# Patient Record
Sex: FEMALE | Race: WHITE | NOT HISPANIC OR LATINO | Employment: FULL TIME | URBAN - NONMETROPOLITAN AREA
[De-identification: names, ages, dates, MRNs, and addresses within clinical notes are randomized per-mention and may not be internally consistent; named-entity substitution may affect disease eponyms.]

---

## 2019-07-28 ENCOUNTER — HOSPITAL ENCOUNTER (EMERGENCY)
Facility: HOSPITAL | Age: 29
Discharge: HOME | End: 2019-07-28
Attending: EMERGENCY MEDICINE | Admitting: EMERGENCY MEDICINE
Payer: COMMERCIAL

## 2019-07-28 DIAGNOSIS — R50.9 FEVER: Primary | ICD-10-CM

## 2019-07-28 DIAGNOSIS — N71.9 ENDOMETRITIS: ICD-10-CM

## 2019-07-28 LAB
ANION GAP SERPL CALCULATED.3IONS-SCNC: 14 MMOL/L (ref 4–13)
BACTERIA UR QL AUTO: ABNORMAL /HPF
BASOPHILS # BLD AUTO: 0.05 THOUSANDS/ΜL (ref 0–0.1)
BASOPHILS NFR BLD AUTO: 0 % (ref 0–1)
BILIRUB UR QL STRIP: NEGATIVE
BUN SERPL-MCNC: 11 MG/DL (ref 5–25)
CALCIUM SERPL-MCNC: 8.6 MG/DL (ref 8.3–10.1)
CHLORIDE SERPL-SCNC: 103 MMOL/L (ref 100–108)
CLARITY UR: CLEAR
CO2 SERPL-SCNC: 23 MMOL/L (ref 21–32)
COLOR UR: YELLOW
CREAT SERPL-MCNC: 0.6 MG/DL (ref 0.6–1.3)
EOSINOPHIL # BLD AUTO: 0.16 THOUSAND/ΜL (ref 0–0.61)
EOSINOPHIL NFR BLD AUTO: 1 % (ref 0–6)
ERYTHROCYTE [DISTWIDTH] IN BLOOD BY AUTOMATED COUNT: 13.2 % (ref 11.6–15.1)
GFR SERPL CREATININE-BSD FRML MDRD: 125 ML/MIN/1.73SQ M
GLUCOSE SERPL-MCNC: 97 MG/DL (ref 65–140)
GLUCOSE UR STRIP-MCNC: NEGATIVE MG/DL
HCT VFR BLD AUTO: 34 % (ref 34.8–46.1)
HGB BLD-MCNC: 10.9 G/DL (ref 11.5–15.4)
HGB UR QL STRIP.AUTO: ABNORMAL
IMM GRANULOCYTES # BLD AUTO: 0.26 THOUSAND/UL (ref 0–0.2)
IMM GRANULOCYTES NFR BLD AUTO: 2 % (ref 0–2)
KETONES UR STRIP-MCNC: NEGATIVE MG/DL
LEUKOCYTE ESTERASE UR QL STRIP: ABNORMAL
LYMPHOCYTES # BLD AUTO: 1.29 THOUSANDS/ΜL (ref 0.6–4.47)
LYMPHOCYTES NFR BLD AUTO: 10 % (ref 14–44)
MAGNESIUM SERPL-MCNC: 1.6 MG/DL (ref 1.6–2.6)
MCH RBC QN AUTO: 30.7 PG (ref 26.8–34.3)
MCHC RBC AUTO-ENTMCNC: 32.1 G/DL (ref 31.4–37.4)
MCV RBC AUTO: 96 FL (ref 82–98)
MONOCYTES # BLD AUTO: 1.05 THOUSAND/ΜL (ref 0.17–1.22)
MONOCYTES NFR BLD AUTO: 8 % (ref 4–12)
NEUTROPHILS # BLD AUTO: 10.11 THOUSANDS/ΜL (ref 1.85–7.62)
NEUTS SEG NFR BLD AUTO: 79 % (ref 43–75)
NITRITE UR QL STRIP: NEGATIVE
NON-SQ EPI CELLS URNS QL MICRO: ABNORMAL /HPF
NRBC BLD AUTO-RTO: 0 /100 WBCS
PH UR STRIP.AUTO: 7 [PH]
PLATELET # BLD AUTO: 340 THOUSANDS/UL (ref 149–390)
PMV BLD AUTO: 9.2 FL (ref 8.9–12.7)
POTASSIUM SERPL-SCNC: 3.5 MMOL/L (ref 3.5–5.3)
PROT UR STRIP-MCNC: NEGATIVE MG/DL
RBC # BLD AUTO: 3.55 MILLION/UL (ref 3.81–5.12)
RBC #/AREA URNS AUTO: ABNORMAL /HPF
SODIUM SERPL-SCNC: 140 MMOL/L (ref 136–145)
SP GR UR STRIP.AUTO: <=1.005 (ref 1–1.03)
UROBILINOGEN UR QL STRIP.AUTO: 0.2 E.U./DL
WBC # BLD AUTO: 12.92 THOUSAND/UL (ref 4.31–10.16)
WBC #/AREA URNS AUTO: ABNORMAL /HPF

## 2019-07-28 PROCEDURE — 99283 EMERGENCY DEPT VISIT LOW MDM: CPT | Performed by: EMERGENCY MEDICINE

## 2019-07-28 PROCEDURE — 80048 BASIC METABOLIC PNL TOTAL CA: CPT | Performed by: EMERGENCY MEDICINE

## 2019-07-28 PROCEDURE — 36415 COLL VENOUS BLD VENIPUNCTURE: CPT | Performed by: EMERGENCY MEDICINE

## 2019-07-28 PROCEDURE — 81001 URINALYSIS AUTO W/SCOPE: CPT | Performed by: EMERGENCY MEDICINE

## 2019-07-28 PROCEDURE — 99283 EMERGENCY DEPT VISIT LOW MDM: CPT

## 2019-07-28 PROCEDURE — 83735 ASSAY OF MAGNESIUM: CPT | Performed by: EMERGENCY MEDICINE

## 2019-07-28 PROCEDURE — 85025 COMPLETE CBC W/AUTO DIFF WBC: CPT | Performed by: EMERGENCY MEDICINE

## 2019-07-28 RX ORDER — AMOXICILLIN AND CLAVULANATE POTASSIUM 875; 125 MG/1; MG/1
1 TABLET, FILM COATED ORAL EVERY 12 HOURS
Qty: 20 TABLET | Refills: 0 | Status: SHIPPED | OUTPATIENT
Start: 2019-07-28 | End: 2019-08-07

## 2019-07-28 RX ORDER — AMOXICILLIN AND CLAVULANATE POTASSIUM 875; 125 MG/1; MG/1
1 TABLET, FILM COATED ORAL ONCE
Status: COMPLETED | OUTPATIENT
Start: 2019-07-28 | End: 2019-07-28

## 2019-07-28 RX ORDER — ACETAMINOPHEN 325 MG/1
650 TABLET ORAL ONCE
Status: COMPLETED | OUTPATIENT
Start: 2019-07-28 | End: 2019-07-28

## 2019-07-28 RX ADMIN — AMOXICILLIN AND CLAVULANATE POTASSIUM 1 TABLET: 875; 125 TABLET, FILM COATED ORAL at 23:47

## 2019-07-28 RX ADMIN — ACETAMINOPHEN 650 MG: 325 TABLET, FILM COATED ORAL at 22:49

## 2019-07-29 NOTE — ED PROVIDER NOTES
History  Chief Complaint   Patient presents with    Fever - 9 weeks to 74 years     Pt who is 1 week post partum c/o acute onset fever ~ 3 hours ago  - 101 orally at home - took 400mg ibuprofen at that time - denies herminio rayjens     This is an otherwise healthy 61-year-old female who is 1 week postpartum from an uncomplicated spontaneous vaginal delivery who presents with a fever  At approximately 7:00 p m , the patient started to feel feverish and chills  She took her temperature and it was 101 orally  She took 400 mg of ibuprofen at that time  She denies any other symptoms  Denies any sick contacts  She does admit to mild vaginal bleeding which has improved since her delivery  Denies nausea/vomiting, lightheadedness/dizziness, numbness/weakness, headache, change in vision, URI symptoms, neck pain, chest pain, palpitations, shortness of breath, cough, back pain, flank pain, abdominal pain, diarrhea, hematochezia, melena, dysuria, hematuria, abnormal vaginal discharge/bleeding  She also denies any breast swelling or erythema  None       History reviewed  No pertinent past medical history  Past Surgical History:   Procedure Laterality Date    TONSILLECTOMY      WISDOM TOOTH EXTRACTION         History reviewed  No pertinent family history  I have reviewed and agree with the history as documented  Social History     Tobacco Use    Smoking status: Never Smoker    Smokeless tobacco: Never Used   Substance Use Topics    Alcohol use: Not Currently    Drug use: Never        Review of Systems   Constitutional: Negative for chills and fever  HENT: Negative for rhinorrhea, sore throat and trouble swallowing  Eyes: Negative for photophobia and visual disturbance  Respiratory: Negative for cough, chest tightness and shortness of breath  Cardiovascular: Negative for chest pain, palpitations and leg swelling     Gastrointestinal: Negative for abdominal pain, blood in stool, diarrhea, nausea and vomiting  Endocrine: Negative for polyuria  Genitourinary: Negative for dysuria, flank pain, hematuria, vaginal bleeding and vaginal discharge  Musculoskeletal: Negative for back pain and neck pain  Skin: Negative for color change and rash  Allergic/Immunologic: Negative for immunocompromised state  Neurological: Negative for dizziness, weakness, light-headedness, numbness and headaches  All other systems reviewed and are negative  Physical Exam  Physical Exam   Constitutional: Vital signs are normal  She appears well-developed  She is cooperative  No distress  HENT:   Mouth/Throat: Uvula is midline, oropharynx is clear and moist and mucous membranes are normal    Eyes: Pupils are equal, round, and reactive to light  Conjunctivae and EOM are normal    Neck: Trachea normal  No thyroid mass and no thyromegaly present  Cardiovascular: Normal rate, regular rhythm, normal heart sounds, intact distal pulses and normal pulses  No murmur heard  Pulmonary/Chest: Effort normal and breath sounds normal    Abdominal: Soft  Normal appearance and bowel sounds are normal  There is no tenderness  There is no rebound, no guarding and no CVA tenderness  Neurological: She is alert  Skin: Skin is warm, dry and intact  Psychiatric: She has a normal mood and affect   Her speech is normal and behavior is normal  Thought content normal        Vital Signs  ED Triage Vitals [07/28/19 2208]   Temperature Pulse Respirations Blood Pressure SpO2   99 6 °F (37 6 °C) 92 18 138/63 97 %      Temp Source Heart Rate Source Patient Position - Orthostatic VS BP Location FiO2 (%)   Temporal Monitor Sitting Right arm --      Pain Score       3           Vitals:    07/28/19 2208   BP: 138/63   Pulse: 92   Patient Position - Orthostatic VS: Sitting         Visual Acuity      ED Medications  Medications   acetaminophen (TYLENOL) tablet 650 mg (650 mg Oral Given 7/28/19 2249)   amoxicillin-clavulanate (AUGMENTIN) 875-125 mg per tablet 1 tablet (1 tablet Oral Given 7/28/19 1641)       Diagnostic Studies  Results Reviewed     Procedure Component Value Units Date/Time    Urine Microscopic [[id number]]  (Abnormal) Collected:  07/28/19 2300    Lab Status:  Final result Specimen:  Urine, Clean Catch Updated:  07/28/19 2325     RBC, UA 2-4 /hpf      WBC, UA 4-10 /hpf      Epithelial Cells Occasional /hpf      Bacteria, UA Occasional /hpf     Basic metabolic panel [[id number]]  (Abnormal) Collected:  07/28/19 2301    Lab Status:  Final result Specimen:  Blood from Arm, Right Updated:  07/28/19 2320     Sodium 140 mmol/L      Potassium 3 5 mmol/L      Chloride 103 mmol/L      CO2 23 mmol/L      ANION GAP 14 mmol/L      BUN 11 mg/dL      Creatinine 0 60 mg/dL      Glucose 97 mg/dL      Calcium 8 6 mg/dL      eGFR 125 ml/min/1 73sq m     Narrative:       [place] guidelines for Chronic Kidney Disease (CKD):     Stage 1 with normal or high GFR (GFR > 90 mL/min/1 73 square meters)    Stage 2 Mild CKD (GFR = 60-89 mL/min/1 73 square meters)    Stage 3A Moderate CKD (GFR = 45-59 mL/min/1 73 square meters)    Stage 3B Moderate CKD (GFR = 30-44 mL/min/1 73 square meters)    Stage 4 Severe CKD (GFR = 15-29 mL/min/1 73 square meters)    Stage 5 End Stage CKD (GFR <15 mL/min/1 73 square meters)  Note: GFR calculation is accurate only with a steady state creatinine    Magnesium [[id number]]  (Normal) Collected:  07/28/19 2301    Lab Status:  Final result Specimen:  Blood from Arm, Right Updated:  07/28/19 2320     Magnesium 1 6 mg/dL     UA w Reflex to Microscopic w Reflex to Culture [[id number]]  (Abnormal) Collected:  07/28/19 2300    Lab Status:  Final result Specimen:  Urine, Clean Catch Updated:  07/28/19 2310     Color, UA Yellow     Clarity, UA Clear     Specific Gravity, UA <=1 005     pH, UA 7 0     Leukocytes, UA Moderate     Nitrite, UA Negative     Protein, UA Negative mg/dl      Glucose, UA Negative mg/dl      Ketones, UA Negative mg/dl      Urobilinogen, UA 0 2 E U /dl      Bilirubin, UA Negative     Blood, UA Small    CBC and differential [[id number]]  (Abnormal) Collected:  07/28/19 2301    Lab Status:  Final result Specimen:  Blood from Arm, Right Updated:  07/28/19 2310     WBC 12 92 Thousand/uL      RBC 3 55 Million/uL      Hemoglobin 10 9 g/dL      Hematocrit 34 0 %      MCV 96 fL      MCH 30 7 pg      MCHC 32 1 g/dL      RDW 13 2 %      MPV 9 2 fL      Platelets 908 Thousands/uL      nRBC 0 /100 WBCs      Neutrophils Relative 79 %      Immat GRANS % 2 %      Lymphocytes Relative 10 %      Monocytes Relative 8 %      Eosinophils Relative 1 %      Basophils Relative 0 %      Neutrophils Absolute 10 11 Thousands/µL      Immature Grans Absolute 0 26 Thousand/uL      Lymphocytes Absolute 1 29 Thousands/µL      Monocytes Absolute 1 05 Thousand/µL      Eosinophils Absolute 0 16 Thousand/µL      Basophils Absolute 0 05 Thousands/µL                  No orders to display              Procedures  Procedures       ED Course                               MDM  Number of Diagnoses or Management Options  Diagnosis management comments: This is a well-appearing 73-year-old female with a normal physical exam   Likely endometritis verses urinary tract infection  Will check lab work and urinalysis  Likely discharge on Augmentin for possible endometritis  Follow up with OBGYN  Disposition  Final diagnoses:   Fever   Endometritis     Time reflects when diagnosis was documented in both MDM as applicable and the Disposition within this note     Time User Action Codes Description Comment    7/28/2019 11:37 PM Quyen Camargo [R50 9] Fever     7/28/2019 11:37 PM Quyen Camargo [N71 9] Endometritis       ED Disposition     ED Disposition Condition Date/Time Comment    Discharge Stable Sun Jul 28, 2019 11:37 PM Chrissy discharge to home/self care              Follow-up Information     Follow up With Specialties Details Why Contact Info Additional [address] [facility] Emergency Department Emergency Medicine Go to  If symptoms worsen [address] [id number] [phone] [place] [facility], [address], [place], [address] [facility], [address]    OB/GYN  Schedule an appointment as soon as possible for a visit  Call in the morning for an appointment            Discharge Medication List as of 7/28/2019 11:38 PM      START taking these medications    Details   amoxicillin-clavulanate (AUGMENTIN) 875-125 mg per tablet Take 1 tablet by mouth every 12 (twelve) hours for 10 days, Starting Sun 7/28/2019, Until Wed 8/7/2019, Print           No discharge procedures on file      ED Provider  Electronically Signed by           Charlie Lindquist MD  07/29/19 9246

## 2019-08-01 VITALS
SYSTOLIC BLOOD PRESSURE: 138 MMHG | BODY MASS INDEX: 24.8 KG/M2 | DIASTOLIC BLOOD PRESSURE: 63 MMHG | HEART RATE: 92 BPM | OXYGEN SATURATION: 97 % | TEMPERATURE: 99.6 F | WEIGHT: 140 LBS | RESPIRATION RATE: 18 BRPM

## 2021-02-13 ENCOUNTER — HOSPITAL ENCOUNTER (EMERGENCY)
Facility: HOSPITAL | Age: 31
Discharge: HOME | End: 2021-02-13
Attending: FAMILY MEDICINE | Admitting: FAMILY MEDICINE
Payer: COMMERCIAL

## 2021-02-13 VITALS
SYSTOLIC BLOOD PRESSURE: 132 MMHG | OXYGEN SATURATION: 97 % | WEIGHT: 150 LBS | HEART RATE: 104 BPM | RESPIRATION RATE: 18 BRPM | DIASTOLIC BLOOD PRESSURE: 69 MMHG | TEMPERATURE: 97.9 F | BODY MASS INDEX: 26.57 KG/M2

## 2021-02-13 DIAGNOSIS — L02.31 ABSCESS OF RIGHT BUTTOCK: Primary | ICD-10-CM

## 2021-02-13 PROCEDURE — 99284 EMERGENCY DEPT VISIT MOD MDM: CPT | Performed by: FAMILY MEDICINE

## 2021-02-13 PROCEDURE — 99282 EMERGENCY DEPT VISIT SF MDM: CPT

## 2021-02-13 RX ORDER — IBUPROFEN 600 MG/1
600 TABLET ORAL ONCE
Status: COMPLETED | OUTPATIENT
Start: 2021-02-13 | End: 2021-02-13

## 2021-02-13 RX ORDER — CLINDAMYCIN HYDROCHLORIDE 150 MG/1
300 CAPSULE ORAL ONCE
Status: COMPLETED | OUTPATIENT
Start: 2021-02-13 | End: 2021-02-13

## 2021-02-13 RX ORDER — CLINDAMYCIN HYDROCHLORIDE 300 MG/1
300 CAPSULE ORAL 4 TIMES DAILY
Qty: 28 CAPSULE | Refills: 0 | Status: SHIPPED | OUTPATIENT
Start: 2021-02-13 | End: 2021-02-20

## 2021-02-13 RX ADMIN — CLINDAMYCIN HYDROCHLORIDE 300 MG: 150 CAPSULE ORAL at 11:52

## 2021-02-13 RX ADMIN — IBUPROFEN 600 MG: 600 TABLET, FILM COATED ORAL at 11:52

## 2021-02-13 NOTE — ED PROVIDER NOTES
History  Chief Complaint   Patient presents with    Abscess       History provided by:  Patient   used: No    This is a 82-year-old female presented to ED with complain of right buttocks abscess that started 2 days ago  Patient states that her  tried manual manipulation at home  She denies any fever chills nausea vomiting at this time  Denies any previous history of similar abscess  Patient denies any abdominal pain nausea vomiting or diarrhea  States that this is the 1st visit did not follow up with the her PCP  None       History reviewed  No pertinent past medical history  Past Surgical History:   Procedure Laterality Date    TONSILLECTOMY      WISDOM TOOTH EXTRACTION         History reviewed  No pertinent family history  I have reviewed and agree with the history as documented  E-Cigarette/Vaping    E-Cigarette Use Never User      E-Cigarette/Vaping Substances     Social History     Tobacco Use    Smoking status: Never Smoker    Smokeless tobacco: Never Used   Substance Use Topics    Alcohol use: Not Currently    Drug use: Never       Review of Systems   Constitutional: Negative  Respiratory: Negative  Cardiovascular: Negative  Gastrointestinal: Negative  Genitourinary: Negative  Musculoskeletal: Negative  Skin: Positive for wound (buttocks abscess)  Neurological: Negative  Psychiatric/Behavioral: Negative  Physical Exam  Physical Exam  Vitals signs and nursing note reviewed  Constitutional:       Appearance: Normal appearance  HENT:      Head: Normocephalic and atraumatic  Eyes:      Extraocular Movements: Extraocular movements intact  Conjunctiva/sclera: Conjunctivae normal       Pupils: Pupils are equal, round, and reactive to light  Neck:      Musculoskeletal: Normal range of motion and neck supple  Cardiovascular:      Rate and Rhythm: Normal rate and regular rhythm     Pulmonary:      Effort: Pulmonary effort is normal       Breath sounds: Normal breath sounds  Abdominal:      General: Bowel sounds are normal       Palpations: Abdomen is soft  Musculoskeletal: Normal range of motion  Skin:     Findings: Abscess present  Neurological:      General: No focal deficit present  Mental Status: She is alert and oriented to person, place, and time  Psychiatric:         Mood and Affect: Mood normal          Behavior: Behavior normal          Vital Signs  ED Triage Vitals [02/13/21 1122]   Temperature Pulse Respirations Blood Pressure SpO2   97 9 °F (36 6 °C) 104 18 132/69 97 %      Temp Source Heart Rate Source Patient Position - Orthostatic VS BP Location FiO2 (%)   Temporal Monitor Sitting Left arm --      Pain Score       6           Vitals:    02/13/21 1122   BP: 132/69   Pulse: 104   Patient Position - Orthostatic VS: Sitting         Visual Acuity      ED Medications  Medications   clindamycin (CLEOCIN) capsule 300 mg (300 mg Oral Given 2/13/21 1152)   ibuprofen (MOTRIN) tablet 600 mg (600 mg Oral Given 2/13/21 1152)       Diagnostic Studies  Results Reviewed     None                 No orders to display              Procedures  Procedures         ED Course                             SBIRT 20yo+      Most Recent Value   SBIRT (24 yo +)   In order to provide better care to our patients, we are screening all of our patients for alcohol and drug use  Would it be okay to ask you these screening questions? Yes Filed at: 02/13/2021 1208   Initial Alcohol Screen: US AUDIT-C    1  How often do you have a drink containing alcohol?  0 Filed at: 02/13/2021 1208   2  How many drinks containing alcohol do you have on a typical day you are drinking? 0 Filed at: 02/13/2021 1208   3a  Male UNDER 65: How often do you have five or more drinks on one occasion? 0 Filed at: 02/13/2021 1208   3b  FEMALE Any Age, or MALE 65+: How often do you have 4 or more drinks on one occassion?   0 Filed at: 02/13/2021 1208   Audit-C Score  0 Filed at: 02/13/2021 1208   FAUSTO: How many times in the past year have you    Used an illegal drug or used a prescription medication for non-medical reasons? Never Filed at: 02/13/2021 1208                    [facility]  Number of Diagnoses or Management Options  Abscess of right buttock:   Diagnosis management comments:  patient refused I and D at this time states she will try warm compress her antibiotics at home  She did allow manual manipulation  Yellow drainage was noted  Recommending warm compress her 20 minutes on 20 minutes off continue with antibiotics if start having fever chills worsening or enlarging abscess return back to the ED patient verbalized understand planned treatment discharge home  Disposition  Final diagnoses:   Abscess of right buttock     Time reflects when diagnosis was documented in both MDM as applicable and the Disposition within this note     Time User Action Codes Description Comment    2/13/2021 12:05 PM Aydee Camarena Add [L05 01] Pilonidal abscess     2/13/2021 12:05 PM Aydee Camraena Add [L02 31,  L03 317] Cellulitis and abscess of buttock     2/13/2021 12:05 PM Aydee Camarena Remove [L02 31,  L03 317] Cellulitis and abscess of buttock     2/13/2021 12:05 PM Aydee Camarena Add [L02 31] Abscess of right buttock     2/13/2021 12:31 PM Aydee Camarena Modify [L02 31] Abscess of right buttock     2/13/2021 12:31 PM Aydee Camarena Remove [L05 01] Pilonidal abscess       ED Disposition     ED Disposition Condition Date/Time Comment    Discharge Stable Sat Feb 13, 2021 12:05 PM Chrissy discharge to home/self care              Follow-up Information    None         Discharge Medication List as of 2/13/2021 12:06 PM      START taking these medications    Details   clindamycin (CLEOCIN) 300 MG capsule Take 1 capsule (300 mg total) by mouth 4 (four) times a day for 7 days, Starting Sat 2/13/2021, Until Sat 2/20/2021, Normal               PDMP Review     None          ED Provider  Electronically Signed by           Beatrice Garcia MD  02/13/21 0308

## 2021-12-04 ENCOUNTER — NURSE TRIAGE (OUTPATIENT)
Dept: OTHER | Facility: OTHER | Age: 31
End: 2021-12-04

## 2021-12-04 DIAGNOSIS — Z20.822 ENCOUNTER FOR SCREENING LABORATORY TESTING FOR COVID-19 VIRUS: Primary | ICD-10-CM

## 2021-12-04 PROCEDURE — U0003 INFECTIOUS AGENT DETECTION BY NUCLEIC ACID (DNA OR RNA); SEVERE ACUTE RESPIRATORY SYNDROME CORONAVIRUS 2 (SARS-COV-2) (CORONAVIRUS DISEASE [COVID-19]), AMPLIFIED PROBE TECHNIQUE, MAKING USE OF HIGH THROUGHPUT TECHNOLOGIES AS DESCRIBED BY CMS-2020-01-R: HCPCS | Performed by: FAMILY MEDICINE

## 2021-12-04 PROCEDURE — U0005 INFEC AGEN DETEC AMPLI PROBE: HCPCS | Performed by: FAMILY MEDICINE

## 2021-12-05 ENCOUNTER — TELEPHONE (OUTPATIENT)
Dept: OTHER | Facility: OTHER | Age: 31
End: 2021-12-05

## 2022-09-01 ENCOUNTER — APPOINTMENT (OUTPATIENT)
Dept: RADIOLOGY | Facility: HOSPITAL | Age: 32
DRG: 776 | End: 2022-09-01
Payer: COMMERCIAL

## 2022-09-01 ENCOUNTER — APPOINTMENT (EMERGENCY)
Dept: CT IMAGING | Facility: HOSPITAL | Age: 32
DRG: 776 | End: 2022-09-01
Payer: COMMERCIAL

## 2022-09-01 ENCOUNTER — OFFICE VISIT (OUTPATIENT)
Dept: URGENT CARE | Facility: CLINIC | Age: 32
End: 2022-09-01
Payer: COMMERCIAL

## 2022-09-01 ENCOUNTER — HOSPITAL ENCOUNTER (INPATIENT)
Facility: HOSPITAL | Age: 32
LOS: 3 days | Discharge: HOME | DRG: 776 | End: 2022-09-04
Attending: EMERGENCY MEDICINE | Admitting: INTERNAL MEDICINE
Payer: COMMERCIAL

## 2022-09-01 VITALS
RESPIRATION RATE: 22 BRPM | SYSTOLIC BLOOD PRESSURE: 80 MMHG | OXYGEN SATURATION: 94 % | BODY MASS INDEX: 28.34 KG/M2 | TEMPERATURE: 101.6 F | HEART RATE: 133 BPM | WEIGHT: 160 LBS | DIASTOLIC BLOOD PRESSURE: 40 MMHG

## 2022-09-01 DIAGNOSIS — U07.1 COVID-19: ICD-10-CM

## 2022-09-01 DIAGNOSIS — N61.0 MASTITIS: Primary | ICD-10-CM

## 2022-09-01 DIAGNOSIS — N61.0 MASTITIS, ACUTE: ICD-10-CM

## 2022-09-01 DIAGNOSIS — A41.9 SEPSIS, DUE TO UNSPECIFIED ORGANISM, UNSPECIFIED WHETHER ACUTE ORGAN DYSFUNCTION PRESENT (HCC): Primary | ICD-10-CM

## 2022-09-01 LAB
ALBUMIN SERPL BCP-MCNC: 4.5 G/DL (ref 3.5–5)
ALP SERPL-CCNC: 90 U/L (ref 34–104)
ALT SERPL W P-5'-P-CCNC: 52 U/L (ref 7–52)
ANION GAP SERPL CALCULATED.3IONS-SCNC: 10 MMOL/L (ref 4–13)
APTT PPP: 25 SECONDS (ref 23–37)
AST SERPL W P-5'-P-CCNC: 39 U/L (ref 13–39)
BACTERIA UR QL AUTO: ABNORMAL /HPF
BASOPHILS # BLD MANUAL: 0 THOUSAND/UL (ref 0–0.1)
BASOPHILS NFR MAR MANUAL: 0 % (ref 0–1)
BILIRUB SERPL-MCNC: 0.98 MG/DL (ref 0.2–1)
BILIRUB UR QL STRIP: NEGATIVE
BUN SERPL-MCNC: 13 MG/DL (ref 5–25)
CALCIUM SERPL-MCNC: 9.5 MG/DL (ref 8.4–10.2)
CAOX CRY URNS QL MICRO: ABNORMAL /HPF
CHLORIDE SERPL-SCNC: 100 MMOL/L (ref 96–108)
CLARITY UR: CLEAR
CO2 SERPL-SCNC: 25 MMOL/L (ref 21–32)
COLOR UR: YELLOW
CREAT SERPL-MCNC: 0.59 MG/DL (ref 0.6–1.3)
D DIMER PPP FEU-MCNC: 0.6 UG/ML FEU
EOSINOPHIL # BLD MANUAL: 0.12 THOUSAND/UL (ref 0–0.4)
EOSINOPHIL NFR BLD MANUAL: 1 % (ref 0–6)
ERYTHROCYTE [DISTWIDTH] IN BLOOD BY AUTOMATED COUNT: 12.7 % (ref 11.6–15.1)
EXT PREG TEST URINE: NEGATIVE
EXT. CONTROL ED NAV: NORMAL
GFR SERPL CREATININE-BSD FRML MDRD: 123 ML/MIN/1.73SQ M
GLUCOSE SERPL-MCNC: 99 MG/DL (ref 65–140)
GLUCOSE UR STRIP-MCNC: NEGATIVE MG/DL
HCT VFR BLD AUTO: 39.6 % (ref 34.8–46.1)
HGB BLD-MCNC: 13.1 G/DL (ref 11.5–15.4)
HGB UR QL STRIP.AUTO: ABNORMAL
INR PPP: 1.1 (ref 0.84–1.19)
KETONES UR STRIP-MCNC: NEGATIVE MG/DL
LACTATE SERPL-SCNC: 1.7 MMOL/L (ref 0.5–2)
LEUKOCYTE ESTERASE UR QL STRIP: NEGATIVE
LYMPHOCYTES # BLD AUTO: 0.25 THOUSAND/UL (ref 0.6–4.47)
LYMPHOCYTES # BLD AUTO: 2 % (ref 14–44)
MCH RBC QN AUTO: 30.4 PG (ref 26.8–34.3)
MCHC RBC AUTO-ENTMCNC: 33.1 G/DL (ref 31.4–37.4)
MCV RBC AUTO: 92 FL (ref 82–98)
MONOCYTES # BLD AUTO: 0.37 THOUSAND/UL (ref 0–1.22)
MONOCYTES NFR BLD: 3 % (ref 4–12)
NEUTROPHILS # BLD MANUAL: 11.66 THOUSAND/UL (ref 1.85–7.62)
NEUTS BAND NFR BLD MANUAL: 3 % (ref 0–8)
NEUTS SEG NFR BLD AUTO: 91 % (ref 43–75)
NITRITE UR QL STRIP: NEGATIVE
NON-SQ EPI CELLS URNS QL MICRO: ABNORMAL /HPF
PH UR STRIP.AUTO: 6.5 [PH]
PLATELET # BLD AUTO: 243 THOUSANDS/UL (ref 149–390)
PLATELET BLD QL SMEAR: ADEQUATE
PMV BLD AUTO: 9.5 FL (ref 8.9–12.7)
POTASSIUM SERPL-SCNC: 3.7 MMOL/L (ref 3.5–5.3)
PROCALCITONIN SERPL-MCNC: 3.15 NG/ML
PROT SERPL-MCNC: 7.2 G/DL (ref 6.4–8.4)
PROT UR STRIP-MCNC: NEGATIVE MG/DL
PROTHROMBIN TIME: 14.2 SECONDS (ref 11.6–14.5)
RBC # BLD AUTO: 4.31 MILLION/UL (ref 3.81–5.12)
RBC #/AREA URNS AUTO: ABNORMAL /HPF
RBC MORPH BLD: NORMAL
SARS-COV-2 AG UPPER RESP QL IA: POSITIVE
SODIUM SERPL-SCNC: 135 MMOL/L (ref 135–147)
SP GR UR STRIP.AUTO: 1.01 (ref 1–1.03)
UROBILINOGEN UR QL STRIP.AUTO: 0.2 E.U./DL
VALID CONTROL: ABNORMAL
WBC # BLD AUTO: 12.4 THOUSAND/UL (ref 4.31–10.16)
WBC #/AREA URNS AUTO: ABNORMAL /HPF

## 2022-09-01 PROCEDURE — 85027 COMPLETE CBC AUTOMATED: CPT | Performed by: EMERGENCY MEDICINE

## 2022-09-01 PROCEDURE — 87040 BLOOD CULTURE FOR BACTERIA: CPT | Performed by: EMERGENCY MEDICINE

## 2022-09-01 PROCEDURE — 87811 SARS-COV-2 COVID19 W/OPTIC: CPT | Performed by: NURSE PRACTITIONER

## 2022-09-01 PROCEDURE — 84145 PROCALCITONIN (PCT): CPT | Performed by: EMERGENCY MEDICINE

## 2022-09-01 PROCEDURE — 99223 1ST HOSP IP/OBS HIGH 75: CPT | Performed by: INTERNAL MEDICINE

## 2022-09-01 PROCEDURE — 96365 THER/PROPH/DIAG IV INF INIT: CPT

## 2022-09-01 PROCEDURE — 96366 THER/PROPH/DIAG IV INF ADDON: CPT

## 2022-09-01 PROCEDURE — 81025 URINE PREGNANCY TEST: CPT | Performed by: EMERGENCY MEDICINE

## 2022-09-01 PROCEDURE — 83605 ASSAY OF LACTIC ACID: CPT | Performed by: EMERGENCY MEDICINE

## 2022-09-01 PROCEDURE — 80053 COMPREHEN METABOLIC PANEL: CPT | Performed by: EMERGENCY MEDICINE

## 2022-09-01 PROCEDURE — 99285 EMERGENCY DEPT VISIT HI MDM: CPT | Performed by: EMERGENCY MEDICINE

## 2022-09-01 PROCEDURE — 85007 BL SMEAR W/DIFF WBC COUNT: CPT | Performed by: EMERGENCY MEDICINE

## 2022-09-01 PROCEDURE — 71045 X-RAY EXAM CHEST 1 VIEW: CPT

## 2022-09-01 PROCEDURE — 99213 OFFICE O/P EST LOW 20 MIN: CPT | Performed by: NURSE PRACTITIONER

## 2022-09-01 PROCEDURE — 71275 CT ANGIOGRAPHY CHEST: CPT

## 2022-09-01 PROCEDURE — 96367 TX/PROPH/DG ADDL SEQ IV INF: CPT

## 2022-09-01 PROCEDURE — 85610 PROTHROMBIN TIME: CPT | Performed by: EMERGENCY MEDICINE

## 2022-09-01 PROCEDURE — 99285 EMERGENCY DEPT VISIT HI MDM: CPT

## 2022-09-01 PROCEDURE — 36415 COLL VENOUS BLD VENIPUNCTURE: CPT | Performed by: EMERGENCY MEDICINE

## 2022-09-01 PROCEDURE — 85730 THROMBOPLASTIN TIME PARTIAL: CPT | Performed by: EMERGENCY MEDICINE

## 2022-09-01 PROCEDURE — 81001 URINALYSIS AUTO W/SCOPE: CPT | Performed by: EMERGENCY MEDICINE

## 2022-09-01 PROCEDURE — G1004 CDSM NDSC: HCPCS

## 2022-09-01 PROCEDURE — 85379 FIBRIN DEGRADATION QUANT: CPT | Performed by: EMERGENCY MEDICINE

## 2022-09-01 PROCEDURE — 93005 ELECTROCARDIOGRAM TRACING: CPT

## 2022-09-01 RX ORDER — ONDANSETRON 2 MG/ML
4 INJECTION INTRAMUSCULAR; INTRAVENOUS EVERY 6 HOURS PRN
Status: DISCONTINUED | OUTPATIENT
Start: 2022-09-01 | End: 2022-09-04 | Stop reason: HOSPADM

## 2022-09-01 RX ORDER — ACETAMINOPHEN 325 MG/1
650 TABLET ORAL EVERY 4 HOURS PRN
Status: DISCONTINUED | OUTPATIENT
Start: 2022-09-01 | End: 2022-09-04 | Stop reason: HOSPADM

## 2022-09-01 RX ORDER — ACETAMINOPHEN 325 MG/1
650 TABLET ORAL EVERY 6 HOURS PRN
Status: DISCONTINUED | OUTPATIENT
Start: 2022-09-01 | End: 2022-09-01

## 2022-09-01 RX ORDER — VANCOMYCIN HYDROCHLORIDE 1 G/200ML
15 INJECTION, SOLUTION INTRAVENOUS EVERY 12 HOURS
Status: DISCONTINUED | OUTPATIENT
Start: 2022-09-02 | End: 2022-09-01

## 2022-09-01 RX ORDER — ENOXAPARIN SODIUM 100 MG/ML
40 INJECTION SUBCUTANEOUS DAILY
Status: DISCONTINUED | OUTPATIENT
Start: 2022-09-02 | End: 2022-09-04 | Stop reason: HOSPADM

## 2022-09-01 RX ORDER — IBUPROFEN 400 MG/1
400 TABLET ORAL EVERY 6 HOURS PRN
Status: DISCONTINUED | OUTPATIENT
Start: 2022-09-01 | End: 2022-09-02

## 2022-09-01 RX ORDER — ACETAMINOPHEN 325 MG/1
650 TABLET ORAL ONCE
Status: COMPLETED | OUTPATIENT
Start: 2022-09-01 | End: 2022-09-01

## 2022-09-01 RX ORDER — CEFTRIAXONE 1 G/50ML
1000 INJECTION, SOLUTION INTRAVENOUS ONCE
Status: COMPLETED | OUTPATIENT
Start: 2022-09-01 | End: 2022-09-01

## 2022-09-01 RX ADMIN — SODIUM CHLORIDE 1000 ML: 0.9 INJECTION, SOLUTION INTRAVENOUS at 13:10

## 2022-09-01 RX ADMIN — ACETAMINOPHEN 325MG 650 MG: 325 TABLET ORAL at 18:32

## 2022-09-01 RX ADMIN — VANCOMYCIN HYDROCHLORIDE 1500 MG: 1 INJECTION, POWDER, LYOPHILIZED, FOR SOLUTION INTRAVENOUS at 14:58

## 2022-09-01 RX ADMIN — IOHEXOL 65 ML: 350 INJECTION, SOLUTION INTRAVENOUS at 15:56

## 2022-09-01 RX ADMIN — ACETAMINOPHEN 650 MG: 325 TABLET ORAL at 13:15

## 2022-09-01 RX ADMIN — VANCOMYCIN HYDROCHLORIDE 1250 MG: 1 INJECTION, POWDER, LYOPHILIZED, FOR SOLUTION INTRAVENOUS at 22:43

## 2022-09-01 RX ADMIN — IBUPROFEN 400 MG: 400 TABLET ORAL at 19:17

## 2022-09-01 RX ADMIN — ACETAMINOPHEN 325MG 650 MG: 325 TABLET ORAL at 22:43

## 2022-09-01 RX ADMIN — CEFTRIAXONE 1000 MG: 1 INJECTION, SOLUTION INTRAVENOUS at 13:36

## 2022-09-01 NOTE — H&P
[address] 1990, 29 y o  female MRN: [id number]  Unit/Bed#: LISSETTE Encounter: [id number]  Primary Care Provider: No primary care provider on file  Date and time admitted to hospital: 9/1/2022 12:51 PM    * Sepsis ([facility] [address] )  Assessment & Plan  · POA; As evidenced by fever, tachycardia, and leukocytosis   · This is in the setting of COVID-19 and right breast mastitis  · BClx (9/1): Pending  · Procal: 3 15, Lactic Acid: 1 7  · Imaging as below  · Received 1 L normal saline bolus and vancomycin in the emergency department  · Further management as below    Acute right post-partum mastitis   Assessment & Plan  · This is the more likely culprit of the patient's sepsis  · CTA chest discussed between radiologist and ED physician; breast tissue appears normal without evidence of abscess  · Warm compress  · Vancomycin given in the ED, will continue at this time  · Plan to transition to Bactrim with clinical improvement and resolution of sepsis parameters to complete a 10 day course    COVID-19  Assessment & Plan  · No acute respiratory symptoms and not requiring supplemental oxygen  · CTA Chest (9/1): No pulmonary embolism   Clear lungs  · As the patient is not requiring supplemental oxygen, will not initiate Remdesivir or Decadron  · Continue supportive care      VTE Pharmacologic Prophylaxis: VTE Score: 3 Moderate Risk (Score 3-4) - Pharmacological DVT Prophylaxis Ordered: enoxaparin (Lovenox)  Code Status: Level 1 - Full Code   Discussion with family: Patient declined call to   Anticipated Length of Stay: Patient will be admitted on an inpatient basis with an anticipated length of stay of greater than 2 midnights secondary to sepsis due to mastitis   Total Time for Visit, including Counseling / Coordination of Care: 60 minutes Greater than 50% of this total time spent on direct patient counseling and coordination of care      Chief Complaint: R breast redness    History of Present Illness:  Erling Kanner is a 29 y o  female who is 2 months post-partum who presented earlier today to [address] now for evaluation of right breast redness  The patient is currently breast feeding and has unfortunately developed right breast redness and pain  Symptoms have been present and intermittent over the last 2 weeks  She has done warm compresses and continue to breastfeed and has had relatively good results with these interventions  Unfortunately, today she noticed chills and that her symptoms were unrelieved with warm compress and breastfeeding  She originally called her OBGYN but Head RD presented to the urgent care before she received a call back  In the urgent care she met sepsis criteria and was found to have COVID-19 and was recommended to be transferred to the emergency department  In the ED she was febrile and tachycardic with leukocytosis and elevated procalcitonin  CTA chest was performed and negative for pulmonary embolism or pneumonia  Imaging was reviewed with Radiology who noted normal breast tissue without evidence of abscess  She was given vancomycin in the ED and admitted to the medical floor for further observation and management  Review of Systems:  Review of Systems   Constitutional: Positive for chills and fever  HENT: Negative for ear pain, sinus pain and sore throat  Eyes: Negative for pain and visual disturbance  Respiratory: Negative for cough, shortness of breath and wheezing  Cardiovascular: Negative for chest pain and palpitations  Gastrointestinal: Negative for abdominal pain, constipation, diarrhea, nausea and vomiting  Genitourinary: Negative for dysuria and hematuria  Musculoskeletal: Negative for arthralgias and back pain  Skin: Positive for color change  Negative for rash  Neurological: Negative for dizziness, seizures and syncope     Psychiatric/Behavioral: Negative for agitation, confusion and hallucinations  All other systems reviewed and are negative  Past Medical and Surgical History:   History reviewed  No pertinent past medical history  Past Surgical History:   Procedure Laterality Date    TONSILLECTOMY      WISDOM TOOTH EXTRACTION         Meds/Allergies:  Prior to Admission medications    Medication Sig Start Date End Date Taking? Authorizing Provider   Prenatal Multivit-Min-Fe-FA (Pre-Ryland Formula) TABS Take by mouth    Historical Provider, MD     I have reviewed home medications with patient personally  Allergies: No Known Allergies    Social History:  Marital Status: /Civil Union   Patient Pre-hospital Living Situation: Home  Patient Pre-hospital Level of Mobility: walks  Patient Pre-hospital Diet Restrictions: None    Substance Use History:   Social History     Substance and Sexual Activity   Alcohol Use Not Currently     Social History     Tobacco Use   Smoking Status Never Smoker   Smokeless Tobacco Never Used     Social History     Substance and Sexual Activity   Drug Use Never       Family History:  History reviewed  No pertinent family history  Physical Exam:     Vitals:   Blood Pressure: 98/56 (22 1455)  Pulse: (!) 120 (22 1608)  Temperature: 99 7 °F (37 6 °C) (22 1455)  Temp Source: Oral (22 1455)  Respirations: 18 (22 1455)  Height: 5' 2" (157 5 cm) (22 1254)  Weight - Scale: 72 6 kg (160 lb) (22 1254)  SpO2: 94 % (22 1455)    Physical Exam  Vitals and nursing note reviewed  Constitutional:       General: She is not in acute distress  Appearance: She is well-developed and normal weight  HENT:      Head: Normocephalic and atraumatic  Mouth/Throat:      Mouth: Mucous membranes are moist       Pharynx: Oropharynx is clear  Eyes:      Extraocular Movements: Extraocular movements intact  Conjunctiva/sclera: Conjunctivae normal    Cardiovascular:      Rate and Rhythm: Regular rhythm   Tachycardia present  Pulses: Normal pulses  Heart sounds: Normal heart sounds  No murmur heard  Pulmonary:      Effort: Pulmonary effort is normal  No respiratory distress  Breath sounds: Normal breath sounds  No wheezing  Abdominal:      General: Bowel sounds are normal  There is no distension  Palpations: Abdomen is soft  Tenderness: There is no abdominal tenderness  Musculoskeletal:         General: Normal range of motion  Cervical back: Normal range of motion and neck supple  Skin:     General: Skin is warm and dry  Comments: R medial breast redness, tenderness, and firm    Neurological:      General: No focal deficit present  Mental Status: She is alert and oriented to person, place, and time  Mental status is at baseline  Psychiatric:         Mood and Affect: Mood normal          Behavior: Behavior normal          Judgment: Judgment normal           Lab Results:  Results from last 7 days   Lab Units 09/01/22  1306   WBC Thousand/uL 12 40*   HEMOGLOBIN g/dL 13 1   HEMATOCRIT % 39 6   PLATELETS Thousands/uL 243   BANDS PCT % 3   LYMPHO PCT % 2*   MONO PCT % 3*   EOS PCT % 1     Results from last 7 days   Lab Units 09/01/22  1306   SODIUM mmol/L 135   POTASSIUM mmol/L 3 7   CHLORIDE mmol/L 100   CO2 mmol/L 25   BUN mg/dL 13   CREATININE mg/dL 0 59*   ANION GAP mmol/L 10   CALCIUM mg/dL 9 5   ALBUMIN g/dL 4 5   TOTAL BILIRUBIN mg/dL 0 98   ALK PHOS U/L 90   ALT U/L 52   AST U/L 39   GLUCOSE RANDOM mg/dL 99     Results from last 7 days   Lab Units 09/01/22  1306   INR  1 10             Results from last 7 days   Lab Units 09/01/22  1306   LACTIC ACID mmol/L 1 7   PROCALCITONIN ng/ml 3 15*       Imaging: Reviewed radiology reports from this admission including: chest xray and chest CT scan  CTA ED chest PE Study   Final Result by Osmany Johnson MD (09/01 1610)      No pulmonary embolism  Clear lungs                    Workstation performed: [id number]         XR chest 1 view portable   Final Result by Nia Martinez MD (09/01 1440)      No acute cardiopulmonary disease  Workstation performed: [id number]             EKG and Other Studies Reviewed on Admission:   · EKG: No EKG obtained  ** Please Note: This note has been constructed using a voice recognition system   **

## 2022-09-01 NOTE — ASSESSMENT & PLAN NOTE
· No acute respiratory symptoms and not requiring supplemental oxygen  · CTA Chest (9/1): No pulmonary embolism   Clear lungs     · As the patient is not requiring supplemental oxygen, will not initiate Remdesivir or Decadron  · Continue supportive care

## 2022-09-01 NOTE — PLAN OF CARE
Problem: PAIN - ADULT  Goal: Verbalizes/displays adequate comfort level or baseline comfort level  Description: Interventions:  - Encourage patient to monitor pain and request assistance  - Assess pain using appropriate pain scale  - Administer analgesics based on type and severity of pain and evaluate response  - Implement non-pharmacological measures as appropriate and evaluate response  - Consider cultural and social influences on pain and pain management  - Notify physician/advanced practitioner if interventions unsuccessful or patient reports new pain  Outcome: Progressing     Problem: INFECTION - ADULT  Goal: Absence or prevention of progression during hospitalization  Description: INTERVENTIONS:  - Assess and monitor for signs and symptoms of infection  - Monitor lab/diagnostic results  - Monitor all insertion sites, i e  indwelling lines, tubes, and drains  - Monitor endotracheal if appropriate and nasal secretions for changes in amount and color  - [place] appropriate cooling/warming therapies per order  - Administer medications as ordered  - Instruct and encourage patient and family to use good hand hygiene technique  - Identify and instruct in appropriate isolation precautions for identified infection/condition  Outcome: Progressing  Goal: Absence of fever/infection during neutropenic period  Description: INTERVENTIONS:  - Monitor WBC    Outcome: Progressing     Problem: DISCHARGE PLANNING  Goal: Discharge to home or other facility with appropriate resources  Description: INTERVENTIONS:  - Identify barriers to discharge w/patient and caregiver  - Arrange for needed discharge resources and transportation as appropriate  - Identify discharge learning needs (meds, wound care, etc )  - Arrange for interpretive services to assist at discharge as needed  - Refer to Case Management Department for coordinating discharge planning if the patient needs post-hospital services based on physician/advanced practitioner order or complex needs related to functional status, cognitive ability, or social support system  Outcome: Progressing     Problem: Knowledge Deficit  Goal: Patient/family/caregiver demonstrates understanding of disease process, treatment plan, medications, and discharge instructions  Description: Complete learning assessment and assess knowledge base    Interventions:  - Provide teaching at level of understanding  - Provide teaching via preferred learning methods  Outcome: Progressing

## 2022-09-01 NOTE — ED PROVIDER NOTES
History  Chief Complaint   Patient presents with    Breast Pain     Right sided breast pain  Patient is breast feeding currently, had a clogged duct on that side last week  Production is slower than left     32-year-old female presenting with right breast discomfort, erythema and warmth started overnight  She went to urgent care for evaluation of her symptoms where she was found to be febrile in COVID positive so they sent her to the emergency department for evaluation  Patient describes pain located in the right breast   She is breast feeding  She denies lump, injury  She states she started having fevers overnight, she took Tylenol at 7:00 a m  Karrie Nissen Shortness of Breath  Severity:  Moderate  Onset quality:  Gradual  Timing:  Constant  Chronicity:  New  Relieved by:  Nothing  Worsened by:  Nothing  Ineffective treatments:  None tried  Associated symptoms: fever and rash    Associated symptoms: no abdominal pain, no chest pain, no cough, no sore throat, no vomiting and no wheezing        Prior to Admission Medications   Prescriptions Last Dose Informant Patient Reported? Taking? Prenatal Multivit-Min-Fe-FA (Pre-Ryland Formula) TABS   Yes No   Sig: Take by mouth      Facility-Administered Medications: None       History reviewed  No pertinent past medical history  Past Surgical History:   Procedure Laterality Date    TONSILLECTOMY      WISDOM TOOTH EXTRACTION         History reviewed  No pertinent family history  I have reviewed and agree with the history as documented  E-Cigarette/Vaping    E-Cigarette Use Never User      E-Cigarette/Vaping Substances     Social History     Tobacco Use    Smoking status: Never Smoker    Smokeless tobacco: Never Used   Vaping Use    Vaping Use: Never used   Substance Use Topics    Alcohol use: Not Currently    Drug use: Never       Review of Systems   Constitutional: Positive for chills and fever     HENT: Negative for congestion, nosebleeds, rhinorrhea and sore throat  Eyes: Negative for pain and visual disturbance  Respiratory: Positive for shortness of breath  Negative for cough and wheezing  Cardiovascular: Negative for chest pain and leg swelling  Gastrointestinal: Negative for abdominal distention, abdominal pain, diarrhea, nausea and vomiting  Genitourinary: Negative for dysuria and frequency  Musculoskeletal: Negative for back pain and joint swelling  Skin: Positive for rash  Negative for wound  Neurological: Negative for weakness and numbness  Psychiatric/Behavioral: Negative for decreased concentration and suicidal ideas  Physical Exam  Physical Exam  Vitals and nursing note reviewed  Exam conducted with a chaperone present (luis (chan))  Constitutional:       Appearance: She is well-developed  HENT:      Head: Normocephalic and atraumatic  Eyes:      Conjunctiva/sclera: Conjunctivae normal       Pupils: Pupils are equal, round, and reactive to light  Neck:      Trachea: No tracheal deviation  Cardiovascular:      Rate and Rhythm: Regular rhythm  Tachycardia present  Heart sounds: Normal heart sounds  No murmur heard  Pulmonary:      Effort: Pulmonary effort is normal  No respiratory distress  Breath sounds: Normal breath sounds  No wheezing or rales  Chest:          Comments: Mild tenderness, warmth, erythema to right breast c/c mastitis  No massess/abscess  Abdominal:      General: Bowel sounds are normal  There is no distension  Palpations: Abdomen is soft  Tenderness: There is no abdominal tenderness  Musculoskeletal:         General: No deformity  Cervical back: Normal range of motion and neck supple  Skin:     General: Skin is warm and dry  Capillary Refill: Capillary refill takes less than 2 seconds  Neurological:      Mental Status: She is alert and oriented to person, place, and time  Sensory: No sensory deficit     Psychiatric:         Judgment: Judgment normal  Vital Signs  ED Triage Vitals [09/01/22 1254]   Temperature Pulse Respirations Blood Pressure SpO2   (!) 102 1 °F (38 9 °C) (!) 127 20 107/56 94 %      Temp Source Heart Rate Source Patient Position - Orthostatic VS BP Location FiO2 (%)   Oral Monitor Sitting Right arm --      Pain Score       7           Vitals:    09/01/22 1440 09/01/22 1455 09/01/22 1608 09/01/22 1747   BP:  98/56  103/61   Pulse: (!) 127 (!) 112 (!) 120 (!) 118   Patient Position - Orthostatic VS:  Lying           Visual Acuity      ED Medications  Medications   ondansetron (ZOFRAN) injection 4 mg (has no administration in time range)   enoxaparin (LOVENOX) subcutaneous injection 40 mg (has no administration in time range)   acetaminophen (TYLENOL) tablet 650 mg (650 mg Oral Given 9/1/22 1832)   ibuprofen (MOTRIN) tablet 400 mg (has no administration in time range)   vancomycin (VANCOCIN) 1,250 mg in sodium chloride 0 9 % 250 mL IVPB (has no administration in time range)   sodium chloride 0 9 % bolus 1,000 mL (1,000 mL Intravenous New Bag 9/1/22 1310)   acetaminophen (TYLENOL) tablet 650 mg (650 mg Oral Given 9/1/22 1315)   cefTRIAXone (ROCEPHIN) IVPB (premix in dextrose) 1,000 mg 50 mL (0 mg Intravenous Stopped 9/1/22 1458)   vancomycin (VANCOCIN) 1500 mg in sodium chloride 0 9% 250 mL IVPB (1,500 mg Intravenous New Bag 9/1/22 1458)   iohexol (OMNIPAQUE) 350 MG/ML injection (SINGLE-DOSE) 65 mL (65 mL Intravenous Given 9/1/22 1556)       Diagnostic Studies  Results Reviewed     Procedure Component Value Units Date/Time    Platelet count [[id number]]     Lab Status: No result Specimen: Blood     D-dimer, quantitative [[id number]]  (Abnormal) Collected: 09/01/22 1306    Lab Status: Final result Specimen: Blood from Arm, Right Updated: 09/01/22 1504     D-Dimer, Quant 0 60 ug/ml FEU     Urine Microscopic [[id number]]  (Abnormal) Collected: 09/01/22 1335    Lab Status: Final result Specimen: Urine, Clean Catch Updated: 09/01/22 1429     RBC, UA 4-10 /hpf      WBC, UA None Seen /hpf      Epithelial Cells Occasional /hpf      Bacteria, UA Occasional /hpf      Ca Oxalate Mallorie, UA Occasional /hpf     CBC and differential [[id number]]  (Abnormal) Collected: 09/01/22 1306    Lab Status: Final result Specimen: Blood from Arm, Right Updated: 09/01/22 1351     WBC 12 40 Thousand/uL      RBC 4 31 Million/uL      Hemoglobin 13 1 g/dL      Hematocrit 39 6 %      MCV 92 fL      MCH 30 4 pg      MCHC 33 1 g/dL      RDW 12 7 %      MPV 9 5 fL      Platelets 211 Thousands/uL     Narrative: This is an appended report  These results have been appended to a previously verified report      Manual Differential(PHLEBS Do Not Order) [[id number]]  (Abnormal) Collected: 09/01/22 1306    Lab Status: Final result Specimen: Blood from Arm, Right Updated: 09/01/22 1351     Segmented % 91 %      Bands % 3 %      Lymphocytes % 2 %      Monocytes % 3 %      Eosinophils, % 1 %      Basophils % 0 %      Absolute Neutrophils 11 66 Thousand/uL      Lymphocytes Absolute 0 25 Thousand/uL      Monocytes Absolute 0 37 Thousand/uL      Eosinophils Absolute 0 12 Thousand/uL      Basophils Absolute 0 00 Thousand/uL      Total Counted --     RBC Morphology Normal     Platelet Estimate Adequate    UA w Reflex to Microscopic w Reflex to Culture [[id number]]  (Abnormal) Collected: 09/01/22 1335    Lab Status: Final result Specimen: Urine, Clean Catch Updated: 09/01/22 1347     Color, UA Yellow     Clarity, UA Clear     Specific Gravity, UA 1 010     pH, UA 6 5     Leukocytes, UA Negative     Nitrite, UA Negative     Protein, UA Negative mg/dl      Glucose, UA Negative mg/dl      Ketones, UA Negative mg/dl      Urobilinogen, UA 0 2 E U /dl      Bilirubin, UA Negative     Occult Blood, UA Trace-Intact    POCT pregnancy, urine [[id number]]  (Normal) Resulted: 09/01/22 1344    Lab Status: Final result Specimen: Urine Updated: 09/01/22 1344     EXT PREG TEST UR (Ref: Negative) Negative     Control Valid Procalcitonin [[id number]]  (Abnormal) Collected: 09/01/22 1306    Lab Status: Final result Specimen: Blood from Arm, Right Updated: 09/01/22 1341     Procalcitonin 3 15 ng/ml     Comprehensive metabolic panel [[id number]]  (Abnormal) Collected: 09/01/22 1306    Lab Status: Final result Specimen: Blood from Arm, Right Updated: 09/01/22 1331     Sodium 135 mmol/L      Potassium 3 7 mmol/L      Chloride 100 mmol/L      CO2 25 mmol/L      ANION GAP 10 mmol/L      BUN 13 mg/dL      Creatinine 0 59 mg/dL      Glucose 99 mg/dL      Calcium 9 5 mg/dL      AST 39 U/L      ALT 52 U/L      Alkaline Phosphatase 90 U/L      Total Protein 7 2 g/dL      Albumin 4 5 g/dL      Total Bilirubin 0 98 mg/dL      eGFR 123 ml/min/1 73sq m     Narrative:      [place] guidelines for Chronic Kidney Disease (CKD):     Stage 1 with normal or high GFR (GFR > 90 mL/min/1 73 square meters)    Stage 2 Mild CKD (GFR = 60-89 mL/min/1 73 square meters)    Stage 3A Moderate CKD (GFR = 45-59 mL/min/1 73 square meters)    Stage 3B Moderate CKD (GFR = 30-44 mL/min/1 73 square meters)    Stage 4 Severe CKD (GFR = 15-29 mL/min/1 73 square meters)    Stage 5 End Stage CKD (GFR <15 mL/min/1 73 square meters)  Note: GFR calculation is accurate only with a steady state creatinine    Lactic acid [[id number]]  (Normal) Collected: 09/01/22 1306    Lab Status: Final result Specimen: Blood from Arm, Right Updated: 09/01/22 1330     LACTIC ACID 1 7 mmol/L     Narrative:      Result may be elevated if tourniquet was used during collection      Protime-INR [[id number]]  (Normal) Collected: 09/01/22 1306    Lab Status: Final result Specimen: Blood from Arm, Right Updated: 09/01/22 1326     Protime 14 2 seconds      INR 1 10    APTT [[id number]]  (Normal) Collected: 09/01/22 1306    Lab Status: Final result Specimen: Blood from Arm, Right Updated: 09/01/22 1326     PTT 25 seconds     Blood culture #2 [[id number]] Collected: 09/01/22 1306 Lab Status: In process Specimen: Blood from Arm, Right Updated: 09/01/22 1311    Blood culture #1 [[id number]] Collected: 09/01/22 1306    Lab Status: In process Specimen: Blood from Arm, Right Updated: 09/01/22 1310                 CTA ED chest PE Study   Final Result by Timothy Marie MD (09/01 1610)      No pulmonary embolism  Clear lungs  Workstation performed: [id number]         XR chest 1 view portable   Final Result by Bernie Watts MD (09/01 1440)      No acute cardiopulmonary disease  Workstation performed: [id number]                    Procedures  Procedures         ED Course                               SBIRT 20yo+    Flowsheet Row Most Recent Value   SBIRT (25 yo +)    In order to provide better care to our patients, we are screening all of our patients for alcohol and drug use  Would it be okay to ask you these screening questions? Yes Filed at: 09/01/2022 1452   Initial Alcohol Screen: US AUDIT-C     1  How often do you have a drink containing alcohol? 0 Filed at: 09/01/2022 1452   2  How many drinks containing alcohol do you have on a typical day you are drinking? 0 Filed at: 09/01/2022 1452   3b  FEMALE Any Age, or MALE 65+: How often do you have 4 or more drinks on one occassion? 0 Filed at: 09/01/2022 1452   Audit-C Score 0 Filed at: 09/01/2022 1452   FAUSTO: How many times in the past year have you    Used an illegal drug or used a prescription medication for non-medical reasons?  Never Filed at: 09/01/2022 1452                    MDM  Number of Diagnoses or Management Options  Mastitis: new and requires workup  Diagnosis management comments: Patient with tachycardia, fever  Mastitis and COVID+  Minimal COVID symptoms, but +SOB she thinks related to the mask  Suspect her fever and tachycardia is more likely due to mastitis in the [place]  Patient was given Tylenol, her temperature did improve however she remained tachycardic and low-set she went was 114 on the monitor  D-dimer slightly elevated, CTA of the chest negative for PE, discussed with Radiology no abscess found either  Due to her persistent tachycardia, borderline blood pressures, leukocytosis and settle leukopenia possibly septic, patient comfortable with admission  Did start vancomycin  Patient counseled regarding pump & dump and formula feeding  She is comfortable with this  Amount and/or Complexity of Data Reviewed  Review and summarize past medical records: yes  Independent visualization of images, tracings, or specimens: yes    Risk of Complications, Morbidity, and/or Mortality  Presenting problems: high  Diagnostic procedures: minimal  Management options: high        Disposition  Final diagnoses:   Mastitis     Time reflects when diagnosis was documented in both MDM as applicable and the Disposition within this note     Time User Action Codes Description Comment    2022  4:35 PM Nicole Gram Add [N61 0] Mastitis       ED Disposition     ED Disposition   Admit    Condition   Stable    Date/Time   Thu Sep 1, 2022  4:35 PM    Comment   Case was discussed with [place] and the patient's admission status was agreed to be Admission Status: observation status to the service of Dr Freddy Valdovinos   Follow-up Information    None         Current Discharge Medication List      CONTINUE these medications which have NOT CHANGED    Details   Prenatal Multivit-Min-Fe-FA (Pre- Formula) TABS Take by mouth             No discharge procedures on file      PDMP Review     None          ED Provider  Electronically Signed by           Anthony Johnston DO  [phone]

## 2022-09-01 NOTE — ED NOTES
Pt pumping milk at this time; pt verbally aware pt she has to dump breast milk at this time     Bette Swift RN  09/01/22 0501

## 2022-09-01 NOTE — PATIENT INSTRUCTIONS
Your vital signs are abnormal and reveal you are septic  You also have right breast mastitis     You are being transferred by EMS to a local emergency department for higher level of care and evaluation  Follow up with your PCp and OBGYN after ED visit

## 2022-09-01 NOTE — ASSESSMENT & PLAN NOTE
· This is the more likely culprit of the patient's sepsis  · CTA chest discussed between radiologist and ED physician; breast tissue appears normal without evidence of abscess  · Warm compress  · Vancomycin given in the ED, will continue at this time  · Plan to transition to Bactrim with clinical improvement and resolution of sepsis parameters to complete a 10 day course

## 2022-09-01 NOTE — PROGRESS NOTES
[facility]        NAME: Mini Elias is a 29 y o  female  : 1990    MRN: [id number]  DATE: 2022  TIME: 12:34 PM    Assessment and Plan   Sepsis, due to unspecified organism, unspecified whether acute organ dysfunction present ([facility] [address] ) [A41 9]  1  Sepsis, due to unspecified organism, unspecified whether acute organ dysfunction present ([facility])  Poct Covid 19 Rapid Antigen Test   2  Mastitis, acute  Poct Covid 19 Rapid Antigen Test    right breast    3  COVID-19           Patient Instructions       Follow up with PCP in 3-5 days  Proceed to  ER if symptoms worsen  Your vital signs are abnormal and reveal you are septic  You also have right breast mastitis  You are being transferred by EMS to a local emergency department for higher level of care and evaluation  Follow up with your PCp and OBGYN after ED visit           Chief Complaint     Chief Complaint   Patient presents with    Abscess    Breast Pain     Redness swelling, chills , breast feeding         History of Present Illness       This is a 29year old female who is 2 months post partum and developed redness and pain of the right breast  She has had no fevers  She comes to care now with chills, vomiting, headache, breast pain  She called her OB but they did not get back to her until she was in our waiting room  Review of Systems   Review of Systems   Constitutional: Positive for chills and fever  HENT: Negative  Eyes: Negative  Respiratory: Negative  Cardiovascular: Negative  Gastrointestinal: Positive for vomiting  Endocrine: Negative  Genitourinary: Negative  Musculoskeletal: Negative  Skin: Positive for wound  Allergic/Immunologic: Negative  Neurological: Positive for headaches  Hematological: Negative  Psychiatric/Behavioral: Negative            Current Medications       Current Outpatient Medications:     Prenatal Multivit-Min-Fe-FA (Pre-Ryland Formula) TABS, Take by mouth, Disp: , Rfl:     Current Allergies     Allergies as of 09/01/2022    (No Known Allergies)            The following portions of the patient's history were reviewed and updated as appropriate: allergies, current medications, past family history, past medical history, past social history, past surgical history and problem list      History reviewed  No pertinent past medical history  Past Surgical History:   Procedure Laterality Date    TONSILLECTOMY      WISDOM TOOTH EXTRACTION         History reviewed  No pertinent family history  Medications have been verified  Objective   BP (!) 80/40 (BP Location: Left arm, Patient Position: Sitting, Cuff Size: Standard) Comment: recheck Lying 110/80  Pulse (!) 133   Temp (!) 101 6 °F (38 7 °C)   Resp 22   Wt 72 6 kg (160 lb)   SpO2 94%   BMI 28 34 kg/m²   No LMP recorded  Physical Exam     Physical Exam  Vitals and nursing note reviewed  Constitutional:       General: She is not in acute distress  Appearance: Normal appearance  She is normal weight  She is ill-appearing, toxic-appearing and diaphoretic  HENT:      Head: Normocephalic and atraumatic  Eyes:      Extraocular Movements: Extraocular movements intact  Cardiovascular:      Rate and Rhythm: Regular rhythm  Tachycardia present  Pulses: Normal pulses  Heart sounds: Normal heart sounds  Pulmonary:      Effort: Pulmonary effort is normal       Breath sounds: Normal breath sounds  Abdominal:      General: There is no distension  Palpations: Abdomen is soft  Tenderness: There is no abdominal tenderness  Musculoskeletal:         General: Normal range of motion  Cervical back: Normal range of motion and neck supple  Skin:     General: Skin is warm  Capillary Refill: Capillary refill takes less than 2 seconds  Coloration: Skin is pale  Findings: Erythema present  Comments: Right breast medially is red, hard, TTP  No definitive abscess noted  Neurological:      General: No focal deficit present  Mental Status: She is alert and oriented to person, place, and time  Psychiatric:         Mood and Affect: Mood normal          Behavior: Behavior normal          Thought Content: Thought content normal          Judgment: Judgment normal          EMS called for transfer to ED for sepsis work up and evaluation - most likely related to mastitis  Covid 19 faintly positive at [facility]         Recheck BP lying 110/80     12:34 PM  EMS here for patient transfer  Aware of faintly + covid

## 2022-09-01 NOTE — ASSESSMENT & PLAN NOTE
· POA;  As evidenced by fever, tachycardia, and leukocytosis   · This is in the setting of COVID-19 and right breast mastitis  · BClx (9/1): Pending  · Procal: 3 15, Lactic Acid: 1 7  · Imaging as below  · Received 1 L normal saline bolus and vancomycin in the emergency department  · Further management as below

## 2022-09-01 NOTE — PROGRESS NOTES
Vancomycin Assessment    Belinda Key is a 29 y o  female who is currently receiving vancomycin 1000 mg IV q12 for skin-soft tissue infection     Relevant clinical data and objective history reviewed:  Creatinine   Date Value Ref Range Status   09/01/2022 0 59 (L) 0 60 - 1 30 mg/dL Final     Comment:     Standardized to IDMS reference method   07/28/2019 0 60 0 60 - 1 30 mg/dL Final     Comment:     Standardized to IDMS reference method     /61   Pulse (!) 118   Temp (!) 100 8 °F (38 2 °C) (Oral)   Resp 20   Ht 5' 2" (1 575 m)   Wt 72 6 kg (160 lb)   SpO2 96%   BMI 29 26 kg/m²   No intake/output data recorded  Lab Results   Component Value Date/Time    BUN 13 09/01/2022 01:06 PM    WBC 12 40 (H) 09/01/2022 01:06 PM    HGB 13 1 09/01/2022 01:06 PM    HCT 39 6 09/01/2022 01:06 PM    MCV 92 09/01/2022 01:06 PM     09/01/2022 01:06 PM     Temp Readings from Last 3 Encounters:   09/01/22 (!) 100 8 °F (38 2 °C) (Oral)   09/01/22 (!) 101 6 °F (38 7 °C)   02/13/21 97 9 °F (36 6 °C) (Temporal)     Vancomycin Days of Therapy: 1    Assessment/Plan  The patient is currently on vancomycin utilizing scheduled dosing based on adjusted body weight (due to obesity)  The patient is currently receiving 1000 mg IV q12 and after clinical evaluation will be changed to 1500 mg IV q8  Pharmacy will also follow closely for s/sx of nephrotoxicity, infusion reactions, and appropriateness of therapy  BMP and CBC will be ordered per protocol  Plan for trough as patient approaches steady state, prior to the 4th  dose at approximately 1330 on 9/2/22  Due to infection severity, will target a trough of 15-20 (appropriate for most indications)   Pharmacy will continue to follow the patients culture results and clinical progress daily      Roseann Dunn, Pharmacist

## 2022-09-02 LAB
ANION GAP SERPL CALCULATED.3IONS-SCNC: 11 MMOL/L (ref 4–13)
ATRIAL RATE: 126 BPM
BUN SERPL-MCNC: 11 MG/DL (ref 5–25)
CALCIUM SERPL-MCNC: 8.5 MG/DL (ref 8.4–10.2)
CHLORIDE SERPL-SCNC: 104 MMOL/L (ref 96–108)
CO2 SERPL-SCNC: 23 MMOL/L (ref 21–32)
CREAT SERPL-MCNC: 0.66 MG/DL (ref 0.6–1.3)
ERYTHROCYTE [DISTWIDTH] IN BLOOD BY AUTOMATED COUNT: 13.3 % (ref 11.6–15.1)
GFR SERPL CREATININE-BSD FRML MDRD: 118 ML/MIN/1.73SQ M
GLUCOSE SERPL-MCNC: 109 MG/DL (ref 65–140)
HCT VFR BLD AUTO: 36.1 % (ref 34.8–46.1)
HGB BLD-MCNC: 11.8 G/DL (ref 11.5–15.4)
MCH RBC QN AUTO: 30.3 PG (ref 26.8–34.3)
MCHC RBC AUTO-ENTMCNC: 32.7 G/DL (ref 31.4–37.4)
MCV RBC AUTO: 93 FL (ref 82–98)
P AXIS: 65 DEGREES
PLATELET # BLD AUTO: 238 THOUSANDS/UL (ref 149–390)
PMV BLD AUTO: 9.6 FL (ref 8.9–12.7)
POTASSIUM SERPL-SCNC: 3.3 MMOL/L (ref 3.5–5.3)
PR INTERVAL: 140 MS
PROCALCITONIN SERPL-MCNC: 9.93 NG/ML
QRS AXIS: 101 DEGREES
QRSD INTERVAL: 78 MS
QT INTERVAL: 286 MS
QTC INTERVAL: 414 MS
RBC # BLD AUTO: 3.9 MILLION/UL (ref 3.81–5.12)
SODIUM SERPL-SCNC: 138 MMOL/L (ref 135–147)
T WAVE AXIS: 23 DEGREES
VANCOMYCIN TROUGH SERPL-MCNC: 12.4 UG/ML (ref 10–20)
VENTRICULAR RATE: 126 BPM
WBC # BLD AUTO: 14.09 THOUSAND/UL (ref 4.31–10.16)

## 2022-09-02 PROCEDURE — 80202 ASSAY OF VANCOMYCIN: CPT | Performed by: INTERNAL MEDICINE

## 2022-09-02 PROCEDURE — 80048 BASIC METABOLIC PNL TOTAL CA: CPT | Performed by: INTERNAL MEDICINE

## 2022-09-02 PROCEDURE — 84145 PROCALCITONIN (PCT): CPT | Performed by: INTERNAL MEDICINE

## 2022-09-02 PROCEDURE — 93010 ELECTROCARDIOGRAM REPORT: CPT | Performed by: INTERNAL MEDICINE

## 2022-09-02 PROCEDURE — 99232 SBSQ HOSP IP/OBS MODERATE 35: CPT | Performed by: INTERNAL MEDICINE

## 2022-09-02 PROCEDURE — 85027 COMPLETE CBC AUTOMATED: CPT | Performed by: INTERNAL MEDICINE

## 2022-09-02 RX ORDER — POTASSIUM CHLORIDE 20 MEQ/1
40 TABLET, EXTENDED RELEASE ORAL ONCE
Status: COMPLETED | OUTPATIENT
Start: 2022-09-02 | End: 2022-09-02

## 2022-09-02 RX ORDER — IBUPROFEN 400 MG/1
400 TABLET ORAL EVERY 6 HOURS PRN
Status: DISCONTINUED | OUTPATIENT
Start: 2022-09-02 | End: 2022-09-04 | Stop reason: HOSPADM

## 2022-09-02 RX ADMIN — VANCOMYCIN HYDROCHLORIDE 1250 MG: 1 INJECTION, POWDER, LYOPHILIZED, FOR SOLUTION INTRAVENOUS at 13:49

## 2022-09-02 RX ADMIN — ACETAMINOPHEN 325MG 650 MG: 325 TABLET ORAL at 14:12

## 2022-09-02 RX ADMIN — VANCOMYCIN HYDROCHLORIDE 1250 MG: 1 INJECTION, POWDER, LYOPHILIZED, FOR SOLUTION INTRAVENOUS at 05:01

## 2022-09-02 RX ADMIN — ENOXAPARIN SODIUM 40 MG: 100 INJECTION SUBCUTANEOUS at 09:20

## 2022-09-02 RX ADMIN — IBUPROFEN 400 MG: 400 TABLET ORAL at 17:10

## 2022-09-02 RX ADMIN — ACETAMINOPHEN 325MG 650 MG: 325 TABLET ORAL at 09:28

## 2022-09-02 RX ADMIN — VANCOMYCIN HYDROCHLORIDE 1500 MG: 1 INJECTION, POWDER, LYOPHILIZED, FOR SOLUTION INTRAVENOUS at 20:26

## 2022-09-02 RX ADMIN — ACETAMINOPHEN 325MG 650 MG: 325 TABLET ORAL at 18:44

## 2022-09-02 RX ADMIN — POTASSIUM CHLORIDE 40 MEQ: 1500 TABLET, EXTENDED RELEASE ORAL at 09:20

## 2022-09-02 NOTE — ASSESSMENT & PLAN NOTE
· This is the more likely culprit of the patient's sepsis  · CTA chest discussed between radiologist and ED physician; breast tissue appears normal without evidence of abscess  · Warm compress  · Continue Vancomycin with pharmacy to dose  · Plan to transition to Bactrim with clinical improvement and resolution of sepsis parameters to complete a 10 day course

## 2022-09-02 NOTE — PROGRESS NOTES
Vancomycin Assessment    Rigo Waldrop is a 29 y o  female who is currently receiving vancomycin 1250 mg IV q8 for skin-soft tissue infection     Relevant clinical data and objective history reviewed:  Creatinine   Date Value Ref Range Status   09/02/2022 0 66 0 60 - 1 30 mg/dL Final     Comment:     Standardized to IDMS reference method   09/01/2022 0 59 (L) 0 60 - 1 30 mg/dL Final     Comment:     Standardized to IDMS reference method   07/28/2019 0 60 0 60 - 1 30 mg/dL Final     Comment:     Standardized to IDMS reference method     /53   Pulse (!) 126   Temp (!) 103 1 °F (39 5 °C)   Resp 20   Ht 5' 2" (1 575 m)   Wt 72 6 kg (160 lb)   SpO2 98%   BMI 29 26 kg/m²   I/O last 3 completed shifts: In: 48 [IV Piggyback:50]  Out: -   Lab Results   Component Value Date/Time    BUN 11 09/02/2022 05:11 AM    WBC 14 09 (H) 09/02/2022 05:11 AM    HGB 11 8 09/02/2022 05:11 AM    HCT 36 1 09/02/2022 05:11 AM    MCV 93 09/02/2022 05:11 AM     09/02/2022 05:11 AM     Temp Readings from Last 3 Encounters:   09/02/22 (!) 103 1 °F (39 5 °C)   09/01/22 (!) 101 6 °F (38 7 °C)   02/13/21 97 9 °F (36 6 °C) (Temporal)     Vancomycin Days of Therapy: 2    Assessment/Plan  The patient is currently on vancomycin utilizing scheduled dosing  The patient is receiving 1250 mg IV q8 with the most recent vancomycin level being at steady-state and sub-therapeutic based on a goal of 15-20 (appropriate for most indications) ; therefore, after clinical evaluation will be changed to 1500 mg IV q8   Pharmacy will continue to follow closely for s/sx of nephrotoxicity, infusion reactions, and appropriateness of therapy  BMP and CBC will be ordered per protocol  Plan for trough as patient approaches steady state, prior to the 4th  dose at approximately 1930 on 9/3/22  Pharmacy will continue to follow the patients culture results and clinical progress daily      Michael Varela, Pharmacist

## 2022-09-02 NOTE — PLAN OF CARE
Problem: PAIN - ADULT  Goal: Verbalizes/displays adequate comfort level or baseline comfort level  Description: Interventions:  - Encourage patient to monitor pain and request assistance  - Assess pain using appropriate pain scale  - Administer analgesics based on type and severity of pain and evaluate response  - Implement non-pharmacological measures as appropriate and evaluate response  - Consider cultural and social influences on pain and pain management  - Notify physician/advanced practitioner if interventions unsuccessful or patient reports new pain  Outcome: Progressing     Problem: INFECTION - ADULT  Goal: Absence or prevention of progression during hospitalization  Description: INTERVENTIONS:  - Assess and monitor for signs and symptoms of infection  - Monitor lab/diagnostic results  - Monitor all insertion sites, i e  indwelling lines, tubes, and drains  - Monitor endotracheal if appropriate and nasal secretions for changes in amount and color  - [place] appropriate cooling/warming therapies per order  - Administer medications as ordered  - Instruct and encourage patient and family to use good hand hygiene technique  - Identify and instruct in appropriate isolation precautions for identified infection/condition  Outcome: Progressing  Goal: Absence of fever/infection during neutropenic period  Description: INTERVENTIONS:  - Monitor WBC    Outcome: Progressing     Problem: DISCHARGE PLANNING  Goal: Discharge to home or other facility with appropriate resources  Description: INTERVENTIONS:  - Identify barriers to discharge w/patient and caregiver  - Arrange for needed discharge resources and transportation as appropriate  - Identify discharge learning needs (meds, wound care, etc )  - Arrange for interpretive services to assist at discharge as needed  - Refer to Case Management Department for coordinating discharge planning if the patient needs post-hospital services based on physician/advanced practitioner order or complex needs related to functional status, cognitive ability, or social support system  Outcome: Progressing     Problem: Knowledge Deficit  Goal: Patient/family/caregiver demonstrates understanding of disease process, treatment plan, medications, and discharge instructions  Description: Complete learning assessment and assess knowledge base    Interventions:  - Provide teaching at level of understanding  - Provide teaching via preferred learning methods  Outcome: Progressing

## 2022-09-02 NOTE — PLAN OF CARE
Problem: PAIN - ADULT  Goal: Verbalizes/displays adequate comfort level or baseline comfort level  Description: Interventions:  - Encourage patient to monitor pain and request assistance  - Assess pain using appropriate pain scale  - Administer analgesics based on type and severity of pain and evaluate response  - Implement non-pharmacological measures as appropriate and evaluate response  - Consider cultural and social influences on pain and pain management  - Notify physician/advanced practitioner if interventions unsuccessful or patient reports new pain  Outcome: Progressing     Problem: INFECTION - ADULT  Goal: Absence or prevention of progression during hospitalization  Description: INTERVENTIONS:  - Assess and monitor for signs and symptoms of infection  - Monitor lab/diagnostic results  - Monitor all insertion sites, i e  indwelling lines, tubes, and drains  - Monitor endotracheal if appropriate and nasal secretions for changes in amount and color  - [place] appropriate cooling/warming therapies per order  - Administer medications as ordered  - Instruct and encourage patient and family to use good hand hygiene technique  - Identify and instruct in appropriate isolation precautions for identified infection/condition  Outcome: Progressing  Goal: Absence of fever/infection during neutropenic period  Description: INTERVENTIONS:  - Monitor WBC    Outcome: Progressing     Problem: DISCHARGE PLANNING  Goal: Discharge to home or other facility with appropriate resources  Description: INTERVENTIONS:  - Identify barriers to discharge w/patient and caregiver  - Arrange for needed discharge resources and transportation as appropriate  - Identify discharge learning needs (meds, wound care, etc )  - Arrange for interpretive services to assist at discharge as needed  - Refer to Case Management Department for coordinating discharge planning if the patient needs post-hospital services based on physician/advanced practitioner order or complex needs related to functional status, cognitive ability, or social support system  Outcome: Progressing     Problem: Knowledge Deficit  Goal: Patient/family/caregiver demonstrates understanding of disease process, treatment plan, medications, and discharge instructions  Description: Complete learning assessment and assess knowledge base    Interventions:  - Provide teaching at level of understanding  - Provide teaching via preferred learning methods  Outcome: Progressing

## 2022-09-02 NOTE — ASSESSMENT & PLAN NOTE
· POA;  As evidenced by fever, tachycardia, and leukocytosis   · Tmax 103 7 over night with increase in leukocytosis   · This is in the setting of COVID-19 and right breast mastitis  · BClx (9/1): NGTD  · Procal increased to 9 93  · Imaging as below  · Continue Vancomycin

## 2022-09-02 NOTE — PROGRESS NOTES
[facility]  Progress Note - Kristin Blake 1990, 29 y o  female MRN: [id number]  Unit/Bed#: -[facility] Encounter: [id number]  Primary Care Provider: No primary care provider on file  Date and time admitted to hospital: 9/1/2022 12:51 PM    * Sepsis ([address] )  Assessment & Plan  · POA; As evidenced by fever, tachycardia, and leukocytosis   · Tmax 103 7 over night with increase in leukocytosis   · This is in the setting of COVID-19 and right breast mastitis  · BClx (9/1): NGTD  · Procal increased to 9 93  · Imaging as below  · Continue Vancomycin    Acute right post-partum mastitis   Assessment & Plan  · This is the more likely culprit of the patient's sepsis  · CTA chest discussed between radiologist and ED physician; breast tissue appears normal without evidence of abscess  · Warm compress  · Continue Vancomycin with pharmacy to dose  · Plan to transition to Bactrim with clinical improvement and resolution of sepsis parameters to complete a 10 day course    COVID-19  Assessment & Plan  · No acute respiratory symptoms and not requiring supplemental oxygen  · CTA Chest (9/1): No pulmonary embolism   Clear lungs  · As the patient is not requiring supplemental oxygen, will not initiate Remdesivir or Decadron  · Continue supportive care      VTE Pharmacologic Prophylaxis: VTE Score: 3 Moderate Risk (Score 3-4) - Pharmacological DVT Prophylaxis Ordered: enoxaparin (Lovenox)  Patient Centered Rounds: I performed bedside rounds with nursing staff today  Discussions with Specialists or Other Care Team Provider: Nursing and CM    Education and Discussions with Family / Patient: Patient declined call to   Time Spent for Care: 30 minutes  More than 50% of total time spent on counseling and coordination of care as described above      Current Length of Stay: 1 day(s)  Current Patient Status: Inpatient   Certification Statement: The patient will continue to require additional inpatient hospital stay due to sepsis due to mastitis   Discharge Plan: TBD based on clinical improvement  Code Status: Level 1 - Full Code    Subjective:   Patient resting comfortably in bed without any acute complaints  She continues to report right breast shortness that this has improved since yesterday  Additionally she continues to report intermittent chills  Objective:     Vitals:   Temp (24hrs), Av 5 °F (38 1 °C), Min:98 3 °F (36 8 °C), Max:103 7 °F (39 8 °C)    Temp:  [98 3 °F (36 8 °C)-103 7 °F (39 8 °C)] 100 3 °F (37 9 °C)  HR:  [103-139] 139  Resp:  [16-22] 20  BP: ()/(40-73) 119/73  SpO2:  [92 %-100 %] 100 %  Body mass index is 29 26 kg/m²  Input and Output Summary (last 24 hours): Intake/Output Summary (Last 24 hours) at 2022 0941  Last data filed at 2022 1859  Gross per 24 hour   Intake 290 ml   Output --   Net 290 ml       Physical Exam:   Physical Exam  Vitals and nursing note reviewed  Constitutional:       General: She is not in acute distress  Appearance: She is well-developed  HENT:      Head: Normocephalic and atraumatic  Mouth/Throat:      Mouth: Mucous membranes are moist       Pharynx: Oropharynx is clear  Eyes:      Extraocular Movements: Extraocular movements intact  Conjunctiva/sclera: Conjunctivae normal    Cardiovascular:      Rate and Rhythm: Regular rhythm  Tachycardia present  Pulses: Normal pulses  Heart sounds: Normal heart sounds  No murmur heard  Pulmonary:      Effort: Pulmonary effort is normal  No respiratory distress  Breath sounds: Normal breath sounds  No wheezing  Abdominal:      General: Bowel sounds are normal  There is no distension  Palpations: Abdomen is soft  Tenderness: There is no abdominal tenderness  Musculoskeletal:         General: Normal range of motion  Cervical back: Normal range of motion and neck supple  Skin:     General: Skin is warm and dry        Comments: Medial Right breast redness, tenderness to palpation, firmness is still present but improved from yesterday   Neurological:      General: No focal deficit present  Mental Status: She is alert and oriented to person, place, and time  Mental status is at baseline  Psychiatric:         Mood and Affect: Mood normal          Behavior: Behavior normal          Judgment: Judgment normal        Labs:  Results from last 7 days   Lab Units 09/02/22  0511 09/01/22  1306   WBC Thousand/uL 14 09* 12 40*   HEMOGLOBIN g/dL 11 8 13 1   HEMATOCRIT % 36 1 39 6   PLATELETS Thousands/uL 238 243   BANDS PCT %  --  3   LYMPHO PCT %  --  2*   MONO PCT %  --  3*   EOS PCT %  --  1     Results from last 7 days   Lab Units 09/02/22  0511 09/01/22  1306   SODIUM mmol/L 138 135   POTASSIUM mmol/L 3 3* 3 7   CHLORIDE mmol/L 104 100   CO2 mmol/L 23 25   BUN mg/dL 11 13   CREATININE mg/dL 0 66 0 59*   ANION GAP mmol/L 11 10   CALCIUM mg/dL 8 5 9 5   ALBUMIN g/dL  --  4 5   TOTAL BILIRUBIN mg/dL  --  0 98   ALK PHOS U/L  --  90   ALT U/L  --  52   AST U/L  --  39   GLUCOSE RANDOM mg/dL 109 99     Results from last 7 days   Lab Units 09/01/22  1306   INR  1 10             Results from last 7 days   Lab Units 09/02/22  0511 09/01/22  1306   LACTIC ACID mmol/L  --  1 7   PROCALCITONIN ng/ml 9 93* 3 15*       Lines/Drains:  Invasive Devices  Report    Peripheral Intravenous Line  Duration           Peripheral IV 09/01/22 Right Antecubital <1 day              Imaging: No new imaging  Recent Cultures (last 7 days):   Results from last 7 days   Lab Units 09/01/22  1306   BLOOD CULTURE  Received in Microbiology Lab  Culture in Progress  Received in Microbiology Lab  Culture in Progress         Last 24 Hours Medication List:   Current Facility-Administered Medications   Medication Dose Route Frequency Provider Last Rate    acetaminophen  650 mg Oral Q4H PRN [facility] Mensah, DO      enoxaparin  40 mg Subcutaneous Daily [facility] ibuprofen  400 mg Oral Q6H PRN [facility] Mensah, DO      ondansetron  4 mg Intravenous Q6H PRN [facility] Mensah, DO      vancomycin  20 mg/kg (Adjusted) Intravenous Q8H [facility] Mensah, DO 1,250 mg (09/02/22 0501)        Today, Patient Was Seen By: Mann Whitlock DO    **Please Note: This note may have been constructed using a voice recognition system  **

## 2022-09-02 NOTE — UTILIZATION REVIEW
Initial Clinical Review    Admission: Date/Time/Statement:   Admission Orders (From admission, onward)     Ordered        09/01/22 1653  INPATIENT ADMISSION  Once                      Orders Placed This Encounter   Procedures    INPATIENT ADMISSION     Standing Status:   Standing     Number of Occurrences:   1     Order Specific Question:   Level of Care     Answer:   Med Surg [16]     Order Specific Question:   Estimated length of stay     Answer:   More than 2 Midnights     Order Specific Question:   Certification     Answer:   I certify that inpatient services are medically necessary for this patient for a duration of greater than two midnights  See H&P and MD Progress Notes for additional information about the patient's course of treatment  ED Arrival Information     Expected   -    Arrival   9/1/2022 12:51    Acuity   Emergent            Means of arrival   Walk-In    Escorted by   [address] [facility]    Service   Hospitalist    Admission type   Emergency            Arrival complaint   Breast pain           Chief Complaint   Patient presents with    Breast Pain     Right sided breast pain  Patient is breast feeding currently, had a clogged duct on that side last week  Production is slower than left       Initial Presentation: 29 y o  female W/PMHX: 2 months post partum,  Currently breast feeding  to ED from Urgent care via ambulance, Presented d/t seen earlier in [address] [facility] for evaluation of rt breast redness  S/S  Intermittent over the past 2 weeks  Has tried warm compresses, and c/w breastfeeding, and had relatively good results with these interventions, today she noticed chills and that her symptoms were unrelieved with warm compress and breastfeeding  She originally called her OBGYN but Head RD presented to the urgent care before she received a call back    In the urgent care she met sepsis criteria and was found to have COVID-19 and was recommended to be transferred to the emergency department  Exam: febrile, tachycardic,  , RT medial breast redness, tenderness and firm  ED work up reveals: leukocytosis, CXR NAD,  CT PE study: no pe,  Elevated D-dimer and procal 3 15, lactic acid 1 7, IVF NSS bolus, IV Vancomycin started, Covid-19 +, no acute respiratory distress, CTA chest breast tissue appears normal w/o evidence of abscess  Plan: BX pending, IV ABX, transition to po bactrim when clinically improving, and resolution of sepsis parameters,  To complete 10 day course, warm compresses, COVID -19, no acute distress, c/w supportive care and trend serial inflamatory markers, no t rquieing supplemental 02, no need to start IV Remdesivir of IV decadron  Trend serial labs with repletion, trend fever curve, prn analgesia, DVT PPX  admitted as INPATIENT, due to Sepsis, acute post partum mastitis,   Date: 9/2/22   Day 2: Sepsis T max 103 7 overnight, with increased leukocytosis, procal incresed to 9 93, c/w iv abx vanco   The patient will continue to require additional inpatient hospital stay due to sepsis due to mastitis  Covid s/s asymptomatic, c/w trending s/s and 02 supplemental needs  She continues to report right breast sotness that this has improved since yesterday  Additionally she continues to report intermittent chills  Persistent tachycardia,  Medial Right breast redness, tenderness to palpation, firmness is still present but improved from yesterday  C/W trending serial labs with repletion as needed, DVT PPX, prn pain control antipyretic prn     Last data filed at 9/2/2022 9274      Gross per 24 hour   Intake 290 ml   Output --   Net 290 ml       ED Triage Vitals [09/01/22 1254]   Temperature Pulse Respirations Blood Pressure SpO2   (!) 102 1 °F (38 9 °C) (!) 127 20 107/56 94 %      Temp Source Heart Rate Source Patient Position - Orthostatic VS BP Location FiO2 (%)   Oral Monitor Sitting Right arm --      Pain Score       7          Wt Readings from Last 1 Encounters:   09/01/22 72 6 kg (160 lb)     Additional Vital Signs:   09/02/22 13:45:11 103 1 °F (39 5 °C) Abnormal  126 Abnormal  -- 105/53 70 98 % -- --   09/02/22 12:07:23 98 8 °F (37 1 °C) 120 Abnormal  -- -- -- 96 % -- --   09/02/22 09:18:53 100 3 °F (37 9 °C) 139 Abnormal  20 119/73 88 100 % None (Room air) --   09/01/22 22:28:40 98 3 °F (36 8 °C) 103 16 107/56 73 92 % -- --   09/01/22 2118 99 8 °F (37 7 °C) -- -- -- -- -- -- --   09/01/22 1911 103 7 °F (39 8 °C) Abnormal  -- -- -- -- -- -- --   09/01/22 17:47:13 100 8 °F (38 2 °C) Abnormal  118 Abnormal  20 103/61 75 96 % None (Room air) --   09/01/22 1608 -- 120 Abnormal  -- -- -- -- -- --   09/01/22 1455 99 7 °F (37 6 °C) 112 Abnormal  18 98/56 93 94 % None (Room air) Lying   09/01/22 1440 -- 127 Abnormal  -- -- -- -- -- --   09/01/22 1439 98 6 °F (37 °C) 114 Abnormal  -- -- -- -- -- --   09/01/22 1414 -- 119 Abnormal  -- -- -- -- -- --       Pertinent Labs/Diagnostic Test Results:   CTA ED chest PE Study   Final Result by Max Sanchez MD (09/01 1610)      No pulmonary embolism  Clear lungs  Workstation performed: [id number]         XR chest 1 view portable   Final Result by Sparkle Henning MD (09/01 1440)      No acute cardiopulmonary disease                    Workstation performed: [id number]               Results from last 7 days   Lab Units 09/02/22  0511 09/01/22  1306   WBC Thousand/uL 14 09* 12 40*   HEMOGLOBIN g/dL 11 8 13 1   HEMATOCRIT % 36 1 39 6   PLATELETS Thousands/uL 238 243   BANDS PCT %  --  3         Results from last 7 days   Lab Units 09/02/22  0511 09/01/22  1306   SODIUM mmol/L 138 135   POTASSIUM mmol/L 3 3* 3 7   CHLORIDE mmol/L 104 100   CO2 mmol/L 23 25   ANION GAP mmol/L 11 10   BUN mg/dL 11 13   CREATININE mg/dL 0 66 0 59*   EGFR ml/min/1 73sq m 118 123   CALCIUM mg/dL 8 5 9 5     Results from last 7 days   Lab Units 09/01/22  1306   AST U/L 39   ALT U/L 52   ALK PHOS U/L 90   TOTAL PROTEIN g/dL 7 2   ALBUMIN g/dL 4 5   TOTAL BILIRUBIN mg/dL 0 98         Results from last 7 days   Lab Units 09/02/22  0511 09/01/22  1306   GLUCOSE RANDOM mg/dL 109 99               Results from last 7 days   Lab Units 09/01/22  1306   D-DIMER QUANTITATIVE ug/ml FEU 0 60*     Results from last 7 days   Lab Units 09/01/22  1306   PROTIME seconds 14 2   INR  1 10   PTT seconds 25         Results from last 7 days   Lab Units 09/02/22  0511 09/01/22  1306   PROCALCITONIN ng/ml 9 93* 3 15*     Results from last 7 days   Lab Units 09/01/22  1306   LACTIC ACID mmol/L 1 7           Results from last 7 days   Lab Units 09/01/22  1335   CLARITY UA  Clear   COLOR UA  Yellow   SPEC GRAV UA  1 010   PH UA  6 5   GLUCOSE UA mg/dl Negative   KETONES UA mg/dl Negative   BLOOD UA  Trace-Intact*   PROTEIN UA mg/dl Negative   NITRITE UA  Negative   BILIRUBIN UA  Negative   UROBILINOGEN UA E U /dl 0 2   LEUKOCYTES UA  Negative   WBC UA /hpf None Seen   RBC UA /hpf 4-10*   BACTERIA UA /hpf Occasional   EPITHELIAL CELLS WET PREP /hpf Occasional                     Results from last 7 days   Lab Units 09/01/22  1306   BLOOD CULTURE  Received in Microbiology Lab  Culture in Progress  Received in Microbiology Lab  Culture in Progress       ED Treatment:   Medication Administration from 09/01/2022 1250 to 09/01/2022 1720       Date/Time Order Dose Route Action     09/01/2022 1310 sodium chloride 0 9 % bolus 1,000 mL 1,000 mL Intravenous New Bag     09/01/2022 1315 acetaminophen (TYLENOL) tablet 650 mg 650 mg Oral Given     09/01/2022 1336 cefTRIAXone (ROCEPHIN) IVPB (premix in dextrose) 1,000 mg 50 mL 1,000 mg Intravenous New Bag     09/01/2022 1458 vancomycin (VANCOCIN) 1500 mg in sodium chloride 0 9% 250 mL IVPB 1,500 mg Intravenous New Bag     09/01/2022 1556 iohexol (OMNIPAQUE) 350 MG/ML injection (SINGLE-DOSE) 65 mL 65 mL Intravenous Given        Admitting Diagnosis: Mastitis [N61 0]  Breast pain [N64 4]  Age/Sex: 29 y o  female  Admission Orders: I/S, scd, up and oob as tolerated, heat to affected area,   Scheduled Medications:  enoxaparin, 40 mg, Subcutaneous, Daily  vancomycin, 1,500 mg, Intravenous, Q8H      Continuous IV Infusions:none     PRN Meds:  acetaminophen, 650 mg, Oral, Q4H PRN 9/1 x 2, 9/2 x 2   ibuprofen, 400 mg, Oral, Q6H PRN 9/1 x 1  ondansetron, 4 mg, Intravenous, Q6H PRN        IP CONSULT TO PHARMACY    Network Utilization Review Department  ATTENTION: Please call with any questions or concerns to [phone] and carefully listen to the prompts so that you are directed to the right person  All voicemails are confidential   Blanca Martinez all requests for admission clinical reviews, approved or denied determinations and any other requests to dedicated fax number below belonging to the campus where the patient is receiving treatment   List of dedicated fax numbers for the Facilities:  [address] DENIALS (Administrative/Medical Necessity) [phone]   [address] (Maternity/NICU/Pediatrics) [phone]   [address] [place] [address]  [address] [facility] [place] [id number] [place] [address] [address] [facility] [place] [address] [phone]

## 2022-09-03 LAB
ANION GAP SERPL CALCULATED.3IONS-SCNC: 8 MMOL/L (ref 4–13)
BUN SERPL-MCNC: 10 MG/DL (ref 5–25)
CALCIUM SERPL-MCNC: 9.1 MG/DL (ref 8.4–10.2)
CHLORIDE SERPL-SCNC: 105 MMOL/L (ref 96–108)
CO2 SERPL-SCNC: 24 MMOL/L (ref 21–32)
CREAT SERPL-MCNC: 0.64 MG/DL (ref 0.6–1.3)
ERYTHROCYTE [DISTWIDTH] IN BLOOD BY AUTOMATED COUNT: 13.5 % (ref 11.6–15.1)
GFR SERPL CREATININE-BSD FRML MDRD: 120 ML/MIN/1.73SQ M
GLUCOSE SERPL-MCNC: 109 MG/DL (ref 65–140)
HCT VFR BLD AUTO: 37.7 % (ref 34.8–46.1)
HGB BLD-MCNC: 12.3 G/DL (ref 11.5–15.4)
MCH RBC QN AUTO: 30.6 PG (ref 26.8–34.3)
MCHC RBC AUTO-ENTMCNC: 32.6 G/DL (ref 31.4–37.4)
MCV RBC AUTO: 94 FL (ref 82–98)
PLATELET # BLD AUTO: 232 THOUSANDS/UL (ref 149–390)
PMV BLD AUTO: 9.9 FL (ref 8.9–12.7)
POTASSIUM SERPL-SCNC: 3.5 MMOL/L (ref 3.5–5.3)
PROCALCITONIN SERPL-MCNC: 4.29 NG/ML
RBC # BLD AUTO: 4.02 MILLION/UL (ref 3.81–5.12)
SODIUM SERPL-SCNC: 137 MMOL/L (ref 135–147)
VANCOMYCIN TROUGH SERPL-MCNC: 16.9 UG/ML (ref 10–20)
WBC # BLD AUTO: 10.11 THOUSAND/UL (ref 4.31–10.16)

## 2022-09-03 PROCEDURE — 99232 SBSQ HOSP IP/OBS MODERATE 35: CPT | Performed by: INTERNAL MEDICINE

## 2022-09-03 PROCEDURE — 85027 COMPLETE CBC AUTOMATED: CPT | Performed by: INTERNAL MEDICINE

## 2022-09-03 PROCEDURE — 84145 PROCALCITONIN (PCT): CPT | Performed by: INTERNAL MEDICINE

## 2022-09-03 PROCEDURE — 80048 BASIC METABOLIC PNL TOTAL CA: CPT | Performed by: INTERNAL MEDICINE

## 2022-09-03 PROCEDURE — 80202 ASSAY OF VANCOMYCIN: CPT | Performed by: INTERNAL MEDICINE

## 2022-09-03 RX ORDER — DIPHENHYDRAMINE HCL 25 MG
25 TABLET ORAL EVERY 6 HOURS PRN
Status: DISCONTINUED | OUTPATIENT
Start: 2022-09-03 | End: 2022-09-04 | Stop reason: HOSPADM

## 2022-09-03 RX ADMIN — DIPHENHYDRAMINE HCL 25 MG: 25 TABLET ORAL at 13:09

## 2022-09-03 RX ADMIN — VANCOMYCIN HYDROCHLORIDE 1500 MG: 1 INJECTION, POWDER, LYOPHILIZED, FOR SOLUTION INTRAVENOUS at 20:12

## 2022-09-03 RX ADMIN — DIPHENHYDRAMINE HCL 25 MG: 25 TABLET ORAL at 20:12

## 2022-09-03 RX ADMIN — ACETAMINOPHEN 325MG 650 MG: 325 TABLET ORAL at 00:03

## 2022-09-03 RX ADMIN — ENOXAPARIN SODIUM 40 MG: 100 INJECTION SUBCUTANEOUS at 08:09

## 2022-09-03 RX ADMIN — VANCOMYCIN HYDROCHLORIDE 1500 MG: 1 INJECTION, POWDER, LYOPHILIZED, FOR SOLUTION INTRAVENOUS at 04:15

## 2022-09-03 RX ADMIN — VANCOMYCIN HYDROCHLORIDE 1500 MG: 1 INJECTION, POWDER, LYOPHILIZED, FOR SOLUTION INTRAVENOUS at 12:37

## 2022-09-03 RX ADMIN — ACETAMINOPHEN 325MG 650 MG: 325 TABLET ORAL at 18:34

## 2022-09-03 RX ADMIN — ACETAMINOPHEN 325MG 650 MG: 325 TABLET ORAL at 09:42

## 2022-09-03 NOTE — PLAN OF CARE
Problem: PAIN - ADULT  Goal: Verbalizes/displays adequate comfort level or baseline comfort level  Description: Interventions:  - Encourage patient to monitor pain and request assistance  - Assess pain using appropriate pain scale  - Administer analgesics based on type and severity of pain and evaluate response  - Implement non-pharmacological measures as appropriate and evaluate response  - Consider cultural and social influences on pain and pain management  - Notify physician/advanced practitioner if interventions unsuccessful or patient reports new pain  Outcome: Progressing     Problem: INFECTION - ADULT  Goal: Absence or prevention of progression during hospitalization  Description: INTERVENTIONS:  - Assess and monitor for signs and symptoms of infection  - Monitor lab/diagnostic results  - Monitor all insertion sites, i e  indwelling lines, tubes, and drains  - Monitor endotracheal if appropriate and nasal secretions for changes in amount and color  - [place] appropriate cooling/warming therapies per order  - Administer medications as ordered  - Instruct and encourage patient and family to use good hand hygiene technique  - Identify and instruct in appropriate isolation precautions for identified infection/condition  Outcome: Progressing  Goal: Absence of fever/infection during neutropenic period  Description: INTERVENTIONS:  - Monitor WBC    Outcome: Progressing     Problem: DISCHARGE PLANNING  Goal: Discharge to home or other facility with appropriate resources  Description: INTERVENTIONS:  - Identify barriers to discharge w/patient and caregiver  - Arrange for needed discharge resources and transportation as appropriate  - Identify discharge learning needs (meds, wound care, etc )  - Arrange for interpretive services to assist at discharge as needed  - Refer to Case Management Department for coordinating discharge planning if the patient needs post-hospital services based on physician/advanced practitioner order or complex needs related to functional status, cognitive ability, or social support system  Outcome: Progressing     Problem: Knowledge Deficit  Goal: Patient/family/caregiver demonstrates understanding of disease process, treatment plan, medications, and discharge instructions  Description: Complete learning assessment and assess knowledge base    Interventions:  - Provide teaching at level of understanding  - Provide teaching via preferred learning methods  Outcome: Progressing

## 2022-09-03 NOTE — NURSING NOTE
Pt states mild right breast pain, 1/10, aching  Afebrile  Right breast noted to be mildly reddened and hardened areas palpated in the breast  No drainage noted at this time  Warm compress applied to same  Tylenol given as ordered for pain  Safety in place

## 2022-09-03 NOTE — ASSESSMENT & PLAN NOTE
· POA;  As evidenced by fever, tachycardia, and leukocytosis   · This is in the setting of COVID-19 and right breast mastitis  · Afebrile since 640 yesterday afternoon and with resolved leukocytosis  · BClx (9/1): NGTD  · Procal downtrending  · Imaging as below  · Continue Vancomycin

## 2022-09-03 NOTE — PROGRESS NOTES
Anirudh 47  Progress Note - Humera Balbuena 1990, 29 y o  female MRN: [id number]  Unit/Bed#: -01 Encounter: [id number]  Primary Care Provider: No primary care provider on file  Date and time admitted to hospital: 9/1/2022 12:51 PM    * Sepsis ([facility] [address] )  Assessment & Plan  · POA; As evidenced by fever, tachycardia, and leukocytosis   · This is in the setting of COVID-19 and right breast mastitis  · Afebrile since 640 yesterday afternoon and with resolved leukocytosis  · BClx (9/1): NGTD  · Procal downtrending  · Imaging as below  · Continue Vancomycin    Acute right post-partum mastitis   Assessment & Plan  · This is the more likely culprit of the patient's sepsis  · CTA chest discussed between radiologist and ED physician; breast tissue appears normal without evidence of abscess  · Warm compress  · Continue Vancomycin with pharmacy to dose  · Plan to transition to Bactrim with clinical improvement and resolution of sepsis parameters to complete a 10 day course    COVID-19  Assessment & Plan  · No acute respiratory symptoms and not requiring supplemental oxygen  · CTA Chest (9/1): No pulmonary embolism   Clear lungs  · As the patient is not requiring supplemental oxygen, will not initiate Remdesivir or Decadron  · Continue supportive care      VTE Pharmacologic Prophylaxis: VTE Score: 3 Moderate Risk (Score 3-4) - Pharmacological DVT Prophylaxis Ordered: enoxaparin (Lovenox)  Patient Centered Rounds: I performed bedside rounds with nursing staff today  Discussions with Specialists or Other Care Team Provider: Nursing    Education and Discussions with Family / Patient: Patient declined call to   Time Spent for Care: 30 minutes  More than 50% of total time spent on counseling and coordination of care as described above      Current Length of Stay: 2 day(s)  Current Patient Status: Inpatient   Certification Statement: The patient will continue to require additional inpatient hospital stay due to sepsis due to breast mastitis  Discharge Plan: Anticipate discharge in 24-48 hrs to home  Code Status: Level 1 - Full Code    Subjective:   Patient resting comfortably in bed with significant improvement in right breast discomfort  Has been afebrile since 640 yesterday afternoon  No significant overnight events reported by nursing  Nursing did report abdominal rash and Benadryl was ordered for itching  Objective:     Vitals:   Temp (24hrs), Av 8 °F (37 7 °C), Min:98 3 °F (36 8 °C), Max:101 °F (38 3 °C)    Temp:  [98 3 °F (36 8 °C)-101 °F (38 3 °C)] 98 3 °F (36 8 °C)  HR:  [109-135] 120  Resp:  [18-23] 23  BP: ()/(51-60) 98/56  SpO2:  [94 %-98 %] 98 %  Body mass index is 29 26 kg/m²  Input and Output Summary (last 24 hours): Intake/Output Summary (Last 24 hours) at 9/3/2022 1417  Last data filed at 9/3/2022 1000  Gross per 24 hour   Intake 370 ml   Output --   Net 370 ml       Physical Exam:   Physical Exam  Vitals and nursing note reviewed  Constitutional:       General: She is not in acute distress  Appearance: She is well-developed  HENT:      Head: Normocephalic and atraumatic  Mouth/Throat:      Mouth: Mucous membranes are moist       Pharynx: Oropharynx is clear  Eyes:      Conjunctiva/sclera: Conjunctivae normal    Cardiovascular:      Rate and Rhythm: Regular rhythm  Tachycardia present  Heart sounds: No murmur heard  Pulmonary:      Effort: Pulmonary effort is normal  No respiratory distress  Breath sounds: Normal breath sounds  Abdominal:      General: Bowel sounds are normal  There is no distension  Palpations: Abdomen is soft  Tenderness: There is no abdominal tenderness  Musculoskeletal:         General: Normal range of motion  Cervical back: Normal range of motion and neck supple  Skin:     General: Skin is warm and dry        Comments: Significant improvement in right breast erythema and firmness Neurological:      General: No focal deficit present  Mental Status: She is alert and oriented to person, place, and time  Mental status is at baseline  Psychiatric:         Mood and Affect: Mood normal          Behavior: Behavior normal          Judgment: Judgment normal         Labs:  Results from last 7 days   Lab Units 09/03/22 0443 09/02/22  0511 09/01/22  1306   WBC Thousand/uL 10 11   < > 12 40*   HEMOGLOBIN g/dL 12 3   < > 13 1   HEMATOCRIT % 37 7   < > 39 6   PLATELETS Thousands/uL 232   < > 243   BANDS PCT %  --   --  3   LYMPHO PCT %  --   --  2*   MONO PCT %  --   --  3*   EOS PCT %  --   --  1    < > = values in this interval not displayed  Results from last 7 days   Lab Units 09/03/22 0443 09/02/22 0511 09/01/22  1306   SODIUM mmol/L 137   < > 135   POTASSIUM mmol/L 3 5   < > 3 7   CHLORIDE mmol/L 105   < > 100   CO2 mmol/L 24   < > 25   BUN mg/dL 10   < > 13   CREATININE mg/dL 0 64   < > 0 59*   ANION GAP mmol/L 8   < > 10   CALCIUM mg/dL 9 1   < > 9 5   ALBUMIN g/dL  --   --  4 5   TOTAL BILIRUBIN mg/dL  --   --  0 98   ALK PHOS U/L  --   --  90   ALT U/L  --   --  52   AST U/L  --   --  39   GLUCOSE RANDOM mg/dL 109   < > 99    < > = values in this interval not displayed  Results from last 7 days   Lab Units 09/01/22  1306   INR  1 10             Results from last 7 days   Lab Units 09/03/22 0443 09/02/22  0511 09/01/22  1306   LACTIC ACID mmol/L  --   --  1 7   PROCALCITONIN ng/ml 4 29* 9 93* 3 15*       Lines/Drains:  Invasive Devices  Report    Peripheral Intravenous Line  Duration           Peripheral IV 09/03/22 Left Antecubital <1 day                Imaging: No new imaging  Recent Cultures (last 7 days):   Results from last 7 days   Lab Units 09/01/22  1306   BLOOD CULTURE  No Growth at 24 hrs  No Growth at 24 hrs         Last 24 Hours Medication List:   Current Facility-Administered Medications   Medication Dose Route Frequency Provider Last Rate    acetaminophen 650 mg Oral Q4H PRN Ayaz Pinna Mensah, DO      diphenhydrAMINE  25 mg Oral Q6H PRN Ayaz Pinna Mesnah, DO      enoxaparin  40 mg Subcutaneous Daily Chayo Lynn [place]      ibuprofen  400 mg Oral Q6H PRN [place] Pinna Mensah, DO      ondansetron  4 mg Intravenous Q6H PRN Ayaz Pinna Mensah, DO      vancomycin  1,500 mg Intravenous Q8H Chayo Lynn DO 1,500 mg (09/03/22 1237)        Today, Patient Was Seen By: Chayo Lynn DO    **Please Note: This note may have been constructed using a voice recognition system  **

## 2022-09-03 NOTE — CASE MANAGEMENT
Case Management Assessment & Discharge Planning Note    Patient name Renu Resendiz  Location [address] 228/-[id number] MRN [id number]  : 1990 Date 9/3/2022       Current Admission Date: 2022  Current Admission Diagnosis:Sepsis [facility])   Patient Active Problem List    Diagnosis Date Noted    Sepsis ([address] ) 2022    COVID-19 2022    Acute right post-partum mastitis  2022      LOS (days): 2  Geometric Mean LOS (GMLOS) (days): 2 50  Days to GMLOS:0 7     OBJECTIVE:    Risk of Unplanned Readmission Score: 5     Current admission status: Inpatient  Preferred Pharmacy:   [address] [place] [address]  Phone: [phone] Fax: [phone]    Primary Care Provider: No primary care provider on file  Primary Insurance: [employer] DAY  Secondary Insurance:     ASSESSMENT:  Active Health Care Proxies    There are no active Health Care Proxies on file  Advance Directives  Does patient have a [address]?: No  Was patient offered paperwork?: Yes (papers given)  Does patient currently have a Health Care decision maker?: Yes, please see Health Care Proxy section  Does patient have Advance Directives?: No  Was patient offered paperwork?: Yes (papers provided)  Primary Contact: Rosangela Rick    Readmission Root Cause  30 Day Readmission: No    Patient Information  Admitted from[de-identified] Home  Mental Status: Alert  During Assessment patient was accompanied by: Not accompanied during assessment  Assessment information provided by[de-identified] Patient  Primary Caregiver: Self  Support Systems: Spouse/significant other  [place] of Residence: [address] do you live in?: [address] [facility] entry access options   Select all that apply : Stairs  Number of steps to enter home : 4  Do the steps have railings?: Yes  Type of Current Residence: [address] [place] [address] home  Upon entering residence, is there a bedroom on the main floor (no further steps)?: No  A bedroom is located on the following floor levels of residence (select all that apply):: 2nd Floor  Upon entering residence, is there a bathroom on the main floor (no further steps)?: Yes  Number of steps to 2nd floor from main floor:  (15)  In the last 12 months, was there a time when you were not able to pay the mortgage or rent on time?: No  In the last 12 months, how many places have you lived?: 1  In the last 12 months, was there a time when you did not have a steady place to sleep or slept in a shelter (including now)?: No  Homeless/housing insecurity resource given?: N/A  Living Arrangements: Lives w/ Spouse/significant other  Is patient a ?: No    Activities of Daily Living Prior to Admission  Functional Status: Independent  Completes ADLs independently?: Yes  Ambulates independently?: Yes  Does patient use assisted devices?: No  Does patient currently own DME?: No  Does patient have a history of Outpatient Therapy (PT/OT)?: No  Does the patient have a history of Short-Term Rehab?: No  Does patient have a history of HHC?: No  [facility]    Patient Information Continued  Income Source: Unemployed  Does patient have prescription coverage?: Yes  Within the past 12 months, you worried that your food would run out before you got the money to buy more : Never true  Within the past 12 months, the food you bought just didn't last and you didn't have money to get more : Never true  Food insecurity resource given?: N/A  Does patient receive dialysis treatments?: No  Does patient have a history of substance abuse?: No  Does patient have a history of Mental Health Diagnosis?: No    PHQ 2/9 Screening   Reviewed PHQ 2/9 Depression Screening Score?: No    Means of Transportation  Means of Transport to Appts[de-identified] Drives Self  In the past 12 months, has lack of transportation kept you from medical appointments or from getting medications?: No  In the past 12 months, has lack of transportation kept you from meetings, work, or from getting things needed for daily living?: No  Was application for public transport provided?: N/A  DISCHARGE DETAILS:    Discharge planning discussed with[de-identified] Pt    Would you like to participate in our [facility] service program?  : No - Declined    Treatment Team Recommendation: Home  Discharge Destination Plan[de-identified] Home  Transport at Discharge : Family

## 2022-09-03 NOTE — NURSING NOTE
RN called to bedside, as pt states that she is not feeling well  She denies pain  States that she has chills  Temp 103 degrees F and pt hr 110  Pt given Tylenol as ordered for same  Ice packs applied to skin for fever  Encouraged to increase po fluids

## 2022-09-03 NOTE — NURSING NOTE
Pt c/o itching and redness on b/l lower legs  On assessment a red raised rash noted to both legs, thighs, and abdomen   made aware of same  New orders pending

## 2022-09-03 NOTE — NURSING NOTE
Right breast noted to be reddened, warm, tender, and hard areas noted on palpation  Pt states that this is from recent chemotherapy  Bra removed for comfort  Pt afebrile

## 2022-09-04 VITALS
BODY MASS INDEX: 29.44 KG/M2 | SYSTOLIC BLOOD PRESSURE: 103 MMHG | HEART RATE: 99 BPM | HEIGHT: 62 IN | OXYGEN SATURATION: 93 % | TEMPERATURE: 98.8 F | DIASTOLIC BLOOD PRESSURE: 58 MMHG | WEIGHT: 160 LBS | RESPIRATION RATE: 26 BRPM

## 2022-09-04 LAB
ANION GAP SERPL CALCULATED.3IONS-SCNC: 9 MMOL/L (ref 4–13)
BUN SERPL-MCNC: 6 MG/DL (ref 5–25)
CALCIUM SERPL-MCNC: 8.7 MG/DL (ref 8.4–10.2)
CHLORIDE SERPL-SCNC: 106 MMOL/L (ref 96–108)
CO2 SERPL-SCNC: 24 MMOL/L (ref 21–32)
CREAT SERPL-MCNC: 0.55 MG/DL (ref 0.6–1.3)
ERYTHROCYTE [DISTWIDTH] IN BLOOD BY AUTOMATED COUNT: 13.5 % (ref 11.6–15.1)
GFR SERPL CREATININE-BSD FRML MDRD: 126 ML/MIN/1.73SQ M
GLUCOSE SERPL-MCNC: 113 MG/DL (ref 65–140)
HCT VFR BLD AUTO: 35.5 % (ref 34.8–46.1)
HGB BLD-MCNC: 11.4 G/DL (ref 11.5–15.4)
MCH RBC QN AUTO: 29.9 PG (ref 26.8–34.3)
MCHC RBC AUTO-ENTMCNC: 32.1 G/DL (ref 31.4–37.4)
MCV RBC AUTO: 93 FL (ref 82–98)
PLATELET # BLD AUTO: 251 THOUSANDS/UL (ref 149–390)
PMV BLD AUTO: 9.7 FL (ref 8.9–12.7)
POTASSIUM SERPL-SCNC: 3.2 MMOL/L (ref 3.5–5.3)
PROCALCITONIN SERPL-MCNC: 2.3 NG/ML
RBC # BLD AUTO: 3.81 MILLION/UL (ref 3.81–5.12)
SODIUM SERPL-SCNC: 139 MMOL/L (ref 135–147)
WBC # BLD AUTO: 10.26 THOUSAND/UL (ref 4.31–10.16)

## 2022-09-04 PROCEDURE — 84145 PROCALCITONIN (PCT): CPT | Performed by: INTERNAL MEDICINE

## 2022-09-04 PROCEDURE — 85027 COMPLETE CBC AUTOMATED: CPT | Performed by: INTERNAL MEDICINE

## 2022-09-04 PROCEDURE — 80048 BASIC METABOLIC PNL TOTAL CA: CPT | Performed by: INTERNAL MEDICINE

## 2022-09-04 PROCEDURE — 99239 HOSP IP/OBS DSCHRG MGMT >30: CPT | Performed by: INTERNAL MEDICINE

## 2022-09-04 RX ORDER — POTASSIUM CHLORIDE 20 MEQ/1
40 TABLET, EXTENDED RELEASE ORAL
Status: COMPLETED | OUTPATIENT
Start: 2022-09-04 | End: 2022-09-04

## 2022-09-04 RX ORDER — SULFAMETHOXAZOLE AND TRIMETHOPRIM 800; 160 MG/1; MG/1
1 TABLET ORAL EVERY 12 HOURS SCHEDULED
Qty: 20 TABLET | Refills: 0 | Status: SHIPPED | OUTPATIENT
Start: 2022-09-04 | End: 2022-09-14

## 2022-09-04 RX ADMIN — ACETAMINOPHEN 325MG 650 MG: 325 TABLET ORAL at 03:43

## 2022-09-04 RX ADMIN — VANCOMYCIN HYDROCHLORIDE 1500 MG: 1 INJECTION, POWDER, LYOPHILIZED, FOR SOLUTION INTRAVENOUS at 12:10

## 2022-09-04 RX ADMIN — ENOXAPARIN SODIUM 40 MG: 100 INJECTION SUBCUTANEOUS at 09:18

## 2022-09-04 RX ADMIN — POTASSIUM CHLORIDE 40 MEQ: 1500 TABLET, EXTENDED RELEASE ORAL at 09:23

## 2022-09-04 RX ADMIN — POTASSIUM CHLORIDE 40 MEQ: 1500 TABLET, EXTENDED RELEASE ORAL at 12:09

## 2022-09-04 RX ADMIN — VANCOMYCIN HYDROCHLORIDE 1500 MG: 1 INJECTION, POWDER, LYOPHILIZED, FOR SOLUTION INTRAVENOUS at 03:43

## 2022-09-04 NOTE — NURSING NOTE
Pt using call bell, asking for tylenol  Temp is 98 9, pt aware however feels 'like I'm going to get a temp'  Vanco infusing  Pt ambulating to bathroom with no issues  Contact and airborne precautions in place

## 2022-09-04 NOTE — PROGRESS NOTES
Vancomycin Assessment    Dagoberto Primrose is a 29 y o  female who is currently receiving vancomycin 1500 mg IV q8 for skin-soft tissue infection     Relevant clinical data and objective history reviewed:  Creatinine   Date Value Ref Range Status   09/03/2022 0 64 0 60 - 1 30 mg/dL Final     Comment:     Standardized to IDMS reference method   09/02/2022 0 66 0 60 - 1 30 mg/dL Final     Comment:     Standardized to IDMS reference method   09/01/2022 0 59 (L) 0 60 - 1 30 mg/dL Final     Comment:     Standardized to IDMS reference method     /64   Pulse (!) 126   Temp 99 5 °F (37 5 °C)   Resp 21   Ht 5' 2" (1 575 m)   Wt 72 6 kg (160 lb)   SpO2 96%   BMI 29 26 kg/m²   I/O last 3 completed shifts: In: 9089 [P O :1080; I V :10]  Out: -   Lab Results   Component Value Date/Time    BUN 10 09/03/2022 04:43 AM    WBC 10 11 09/03/2022 04:43 AM    HGB 12 3 09/03/2022 04:43 AM    HCT 37 7 09/03/2022 04:43 AM    MCV 94 09/03/2022 04:43 AM     09/03/2022 04:43 AM     Temp Readings from Last 3 Encounters:   09/03/22 99 5 °F (37 5 °C)   09/01/22 (!) 101 6 °F (38 7 °C)   02/13/21 97 9 °F (36 6 °C) (Temporal)     Vancomycin Days of Therapy: 3    Assessment/Plan  The patient is currently on vancomycin utilizing scheduled dosing  The patient is receiving 1500 mg IV q8 with the most recent vancomycin level being at steady-state and therapeutic based on a goal of 15-20 (appropriate for most indications) ; therefore, is clinically appropriate and dose will be continued   Pharmacy will continue to follow closely for s/sx of nephrotoxicity, infusion reactions, and appropriateness of therapy  BMP and CBC will be ordered per protocol  Plan for trough as patient approaches steady state, prior to the other  dose at approximately 1130 on 9/6/22  Pharmacy will continue to follow the patients culture results and clinical progress daily      Mulu Goldman, Pharmacist

## 2022-09-04 NOTE — NURSING NOTE
Pt stating she feels better although temp is 99 2  morning labs obtained with no issues  Water and ginger ale at bedside, encouraged to drink

## 2022-09-04 NOTE — ASSESSMENT & PLAN NOTE
· Significant clinical improvement; only mild tenderness to deep palpation  · CTA chest discussed between radiologist and ED physician; breast tissue appears normal without evidence of abscess  · Recommend continued supportive care with warm compresses and pumping/breast-feeding as tolerated  · Discharge home with Bactrim to complete an additional 10 days  · Advised to follow-up with OB within a week or sooner if symptoms worsen

## 2022-09-04 NOTE — ASSESSMENT & PLAN NOTE
· POA; As evidenced by fever, tachycardia, and leukocytosis   · This is in the setting of COVID-19 and right breast mastitis  · Fever of 103 at 6:00 p m  Yesterday- at this point I believe her persistent/intermittent fevers are more likely related to COVID than mastitis as there has been significant clinical improvement and resolution of leukocytosis with down trending procalcitonin  · BClx (9/1): NGTD  · Imaging as below  · Combined decision making with myself and the patient was performed; I gave her the choice to remain in-house with further IV antibiotic therapy given febrile yesterday evening verses discharge home with oral antibiotic and Tylenol as needed for fever  The patient elected to be discharged home

## 2022-09-04 NOTE — NURSING NOTE
Order received for discharge home  Pt made aware of same  IV to be removed after IV completed  Discharge instructions reviewed with pt and questions addressed  Pt dressed and belongings gathered for discharge  Awaiting iv to be completed, and family member to pick her up

## 2022-09-04 NOTE — NURSING NOTE
No c/o at this time  Right breast remains mildly pink  No drainage from nipple  Tender on moderate palpation  Rash to abdomen and ble diminishing  Pt denies itching  Afebrile

## 2022-09-04 NOTE — DISCHARGE SUMMARY
Stevie[address]ita 47  Discharge- George Arredondo 1990, 29 y o  female MRN: [id number]  Unit/Bed#: -01 Encounter: [id number]  Primary Care Provider: No primary care provider on file  Date and time admitted to hospital: 9/1/2022 12:51 PM    * Sepsis ([address] )  Assessment & Plan  · POA; As evidenced by fever, tachycardia, and leukocytosis   · This is in the setting of COVID-19 and right breast mastitis  · Fever of 103 at 6:00 p m  Yesterday- at this point I believe her persistent/intermittent fevers are more likely related to COVID than mastitis as there has been significant clinical improvement and resolution of leukocytosis with down trending procalcitonin  · BClx (9/1): NGTD  · Imaging as below  · Combined decision making with myself and the patient was performed; I gave her the choice to remain in-house with further IV antibiotic therapy given febrile yesterday evening verses discharge home with oral antibiotic and Tylenol as needed for fever  The patient elected to be discharged home  Acute right post-partum mastitis   Assessment & Plan  · Significant clinical improvement; only mild tenderness to deep palpation  · CTA chest discussed between radiologist and ED physician; breast tissue appears normal without evidence of abscess  · Recommend continued supportive care with warm compresses and pumping/breast-feeding as tolerated  · Discharge home with Bactrim to complete an additional 10 days  · Advised to follow-up with OB within a week or sooner if symptoms worsen    COVID-19  Assessment & Plan  · No respiratory symptoms on admission but is reporting mild sore throat at this time  · Not requiring supplemental oxygen  · CTA Chest (9/1): No pulmonary embolism   Clear lungs     · Continue supportive care      Medical Problems             Resolved Problems  Date Reviewed: 9/1/2022   None               Discharging Physician / Practitioner: Lucy Parents, DO  PCP: No primary care provider on file   Admission Date:   Admission Orders (From admission, onward)     Ordered        09/01/22 1653  1 [facility],[address]  Once                      Discharge Date: 09/04/22    Consultations During Hospital Stay:  · None    Procedures Performed:   · None    Significant Findings / Test Results:   · COVID-19 + 9/1/2022  · CTA Chest without evidence of PE, pneumonia, or breast abscess    Incidental Findings:   · None     Test Results Pending at Discharge (will require follow up): · None     Outpatient Tests Requested:  · To be determined in the outpatient setting upon follow-up with her primary care/OB    Complications:  None    Reason for Admission:  Sepsis due to breast mastitis    Hospital Course:   Mariela Hernandez is a 29 y o  female patient who originally presented to the hospital on 9/1/2022 due to right breast infection  Please see H and P as documented by myself for complete details regarding history of presenting illness  In brief, the patient is 2 months postpartum who presented to [facility] for evaluation of right breast redness  At the urgent care, the patient was notably positive for COVID, with sepsis criteria, and clinical evidence of right breast mastitis and was therefore transferred from the urgent care to the emergency department  In the ED, she was febrile and tachycardic with leukocytosis and elevated procalcitonin  CTA chest was performed and negative for pulmonary embolism, pneumonia, or breast abscess  She was initiated on vancomycin and admitted to the medical floor for further observation and management  During admission she clinically improved from mastitis perspective daily with resolution of her leukocytosis and insignificant down trend in procalcitonin  She did have intermittent febrile episodes which could have been related to either the the mastitis or COVID    As she had significant improvement clinically of the right breast with IV antibiotics and resolution of white count, persistent fevers were deemed likely more related to COVID  Given this, the patient was discharged home with oral Bactrim to complete a 10 day course or mastitis and advised on supportive care for COVID including Tylenol as needed for fever  If worsening of her symptoms were to occur, she was advised to call her OBGYN and present to the emergency department for further evaluation  She was ultimately discharged home in stable condition all of her questions were answered prior to departure  This is a brief discharge summary please see full medical record for more details  Please see above list of diagnoses and related plan for additional information  Condition at Discharge: good    Discharge Day Visit / Exam:   Subjective:  Patient resting comfortably in bed without any acute complaints  No significant overnight events reported by nursing  Vitals: Blood Pressure: 103/58 (09/04/22 0656)  Pulse: 99 (09/04/22 0656)  Temperature: 98 8 °F (37 1 °C) (09/04/22 0656)  Temp Source: Oral (09/04/22 0656)  Respirations: (!) 26 (09/04/22 0656)  Height: 5' 2" (157 5 cm) (09/01/22 1254)  Weight - Scale: 72 6 kg (160 lb) (09/01/22 1254)  SpO2: 93 % (09/04/22 0656)     Exam:   Physical Exam  Vitals and nursing note reviewed  Constitutional:       General: She is not in acute distress  Appearance: Normal appearance  She is well-developed  HENT:      Head: Normocephalic and atraumatic  Mouth/Throat:      Mouth: Mucous membranes are moist       Pharynx: Oropharynx is clear  Eyes:      Conjunctiva/sclera: Conjunctivae normal    Cardiovascular:      Rate and Rhythm: Normal rate and regular rhythm  Pulses: Normal pulses  Heart sounds: Normal heart sounds  No murmur heard  Pulmonary:      Effort: Pulmonary effort is normal  No respiratory distress  Breath sounds: Normal breath sounds  Abdominal:      General: Bowel sounds are normal  There is no distension        Palpations: Abdomen is soft  Tenderness: There is no abdominal tenderness  Musculoskeletal:         General: Normal range of motion  Cervical back: Normal range of motion and neck supple  Skin:     General: Skin is warm and dry  Comments: Near complete resolution a right breast mastitis   Neurological:      General: No focal deficit present  Mental Status: She is alert and oriented to person, place, and time  Mental status is at baseline  Psychiatric:         Mood and Affect: Mood normal          Behavior: Behavior normal          Judgment: Judgment normal         Discussion with Family: Patient declined call to   Discharge instructions/Information to patient and family:   See after visit summary for information provided to patient and family  Provisions for Follow-Up Care:  See after visit summary for information related to follow-up care and any pertinent home health orders  Disposition:   Home    Planned Readmission:  None     Discharge Statement:  I spent >35 minutes discharging the patient  This time was spent on the day of discharge  I had direct contact with the patient on the day of discharge  Greater than 50% of the total time was spent examining patient, answering all patient questions, arranging and discussing plan of care with patient as well as directly providing post-discharge instructions  Additional time then spent on discharge activities  Discharge Medications:  See after visit summary for reconciled discharge medications provided to patient and/or family        **Please Note: This note may have been constructed using a voice recognition system**

## 2022-09-04 NOTE — PROGRESS NOTES
Vancomycin IV Pharmacy-to-Dose Consultation    Erling Kanner is a 29 y o  female who is currently receiving Vancomycin IV with management by the Pharmacy Consult service  Assessment/Plan:  The patient was reviewed  Renal function is stable and no signs or symptoms of nephrotoxicity and/or infusion reactions were documented in the chart  Based on todays assessment, continue current vancomycin (day # 4) dosing of 1500 mg Iv q8h, with a plan for trough to be drawn at 1130 on 09/06  We will continue to follow the patients culture results and clinical progress daily      Karen Leslie, Pharmacist

## 2022-09-04 NOTE — ASSESSMENT & PLAN NOTE
· No respiratory symptoms on admission but is reporting mild sore throat at this time  · Not requiring supplemental oxygen  · CTA Chest (9/1): No pulmonary embolism   Clear lungs     · Continue supportive care

## 2022-09-04 NOTE — PLAN OF CARE
Problem: PAIN - ADULT  Goal: Verbalizes/displays adequate comfort level or baseline comfort level  Description: Interventions:  - Encourage patient to monitor pain and request assistance  - Assess pain using appropriate pain scale  - Administer analgesics based on type and severity of pain and evaluate response  - Implement non-pharmacological measures as appropriate and evaluate response  - Consider cultural and social influences on pain and pain management  - Notify physician/advanced practitioner if interventions unsuccessful or patient reports new pain  Outcome: Progressing     Problem: INFECTION - ADULT  Goal: Absence or prevention of progression during hospitalization  Description: INTERVENTIONS:  - Assess and monitor for signs and symptoms of infection  - Monitor lab/diagnostic results  - Monitor all insertion sites, i e  indwelling lines, tubes, and drains  - Monitor endotracheal if appropriate and nasal secretions for changes in amount and color  - [place] appropriate cooling/warming therapies per order  - Administer medications as ordered  - Instruct and encourage patient and family to use good hand hygiene technique  - Identify and instruct in appropriate isolation precautions for identified infection/condition  Outcome: Progressing  Goal: Absence of fever/infection during neutropenic period  Description: INTERVENTIONS:  - Monitor WBC    Outcome: Progressing     Problem: DISCHARGE PLANNING  Goal: Discharge to home or other facility with appropriate resources  Description: INTERVENTIONS:  - Identify barriers to discharge w/patient and caregiver  - Arrange for needed discharge resources and transportation as appropriate  - Identify discharge learning needs (meds, wound care, etc )  - Arrange for interpretive services to assist at discharge as needed  - Refer to Case Management Department for coordinating discharge planning if the patient needs post-hospital services based on physician/advanced practitioner order or complex needs related to functional status, cognitive ability, or social support system  Outcome: Progressing     Problem: Knowledge Deficit  Goal: Patient/family/caregiver demonstrates understanding of disease process, treatment plan, medications, and discharge instructions  Description: Complete learning assessment and assess knowledge base    Interventions:  - Provide teaching at level of understanding  - Provide teaching via preferred learning methods  Outcome: Progressing

## 2022-09-06 LAB
BACTERIA BLD CULT: NORMAL
BACTERIA BLD CULT: NORMAL

## 2022-09-06 NOTE — UTILIZATION REVIEW
Inpatient Admission Authorization Request   NOTIFICATION OF INPATIENT ADMISSION/INPATIENT AUTHORIZATION REQUEST   SERVICING FACILITY:   [facility]  [address] [place] [address], [address] [place] [address], [address] [facility]  Tax ID: [id number]  NPI: [id number]  Place of Service: Inpatient [address] [facility]  [address]  Place of Service Code: 24     ATTENDING PROVIDER:  Attending Name and NPI#: Sayra Zapien Do [[id number]]  Address: [address] [place] [address], [facility]  Phone: [phone]     UTILIZATION REVIEW CONTACT:  Yessica Reason, Utilization Review Supervisor  Network Utilization Review Department  Phone: [phone]  Fax [phone]  Email: [email or web address]     PHYSICIAN ADVISORY SERVICES:  FOR UEDH-QD-CZUH REVIEW - MEDICAL NECESSITY DENIAL  Phone: [phone]  Fax: [phone]  Email: [email or web address]     TYPE OF REQUEST:  Inpatient Status     ADMISSION INFORMATION:  ADMISSION DATE/TIME: 9/1/22  4:53 PM  PATIENT DIAGNOSIS CODE/DESCRIPTION:  Mastitis [N61 0]  Breast pain [N64 4]  DISCHARGE DATE/TIME: 9/4/2022  3:36 PM   IMPORTANT INFORMATION:  Please contact Yessica Gastelum directly with any questions or concerns regarding this request  Department voicemails are confidential     Send requests for admission clinical reviews, concurrent reviews, approvals, and administrative denials due to lack of clinical to fax [phone]

## 2022-09-12 NOTE — UTILIZATION REVIEW
Notification of Discharge   This is a Notification of Discharge from our facility [address]  Please be advised that this patient has been discharge from our facility  Below you will find the admission and discharge date and time including the patients disposition  UTILIZATION REVIEW CONTACT:  Sami Liz  Utilization   Network Utilization Review Department  Phone: [phone] carefully listen to the prompts  All voicemails are confidential   Email: [email or web address]     PHYSICIAN ADVISORY SERVICES:  FOR JUHY-AL-EWXM REVIEW - MEDICAL NECESSITY DENIAL  Phone: [phone]  Fax: [phone]  Email: [email or web address]     PRESENTATION DATE: 9/1/2022 12:51 PM  OBERVATION ADMISSION DATE:   INPATIENT ADMISSION DATE: 9/1/22  4:53 PM   DISCHARGE DATE: 9/4/2022  3:36 PM  DISPOSITION: Home/Self Care Home/Self Care      IMPORTANT INFORMATION:  Send all requests for admission clinical reviews, approved or denied determinations and any other requests to dedicated fax number below belonging to the campus where the patient is receiving treatment   List of dedicated fax numbers:  [address] DENIALS (Administrative/Medical Necessity) [phone]   [address] (Maternity/NICU/Pediatrics) [phone]   Shobha Bobby [phone]   [facility] [phone]   [place] [phone]   [address] [phone]   [facility]  [phone]   [address] [facility] [address]  2401 [facility] 1000 [address] [facility] [phone]

## 2022-10-31 PROBLEM — A41.9 SEPSIS (HCC): Status: RESOLVED | Noted: 2022-09-01 | Resolved: 2022-10-31

## 2024-03-25 ENCOUNTER — OFFICE VISIT (OUTPATIENT)
Dept: URGENT CARE | Facility: CLINIC | Age: 34
End: 2024-03-25
Payer: COMMERCIAL

## 2024-03-25 VITALS
OXYGEN SATURATION: 99 % | HEART RATE: 106 BPM | TEMPERATURE: 98 F | BODY MASS INDEX: 30.36 KG/M2 | RESPIRATION RATE: 20 BRPM | WEIGHT: 166 LBS | DIASTOLIC BLOOD PRESSURE: 74 MMHG | SYSTOLIC BLOOD PRESSURE: 122 MMHG

## 2024-03-25 DIAGNOSIS — J06.9 ACUTE URI: Primary | ICD-10-CM

## 2024-03-25 DIAGNOSIS — K08.89 PAIN, DENTAL: ICD-10-CM

## 2024-03-25 PROCEDURE — 99214 OFFICE O/P EST MOD 30 MIN: CPT | Performed by: NURSE PRACTITIONER

## 2024-03-25 RX ORDER — AMOXICILLIN 500 MG/1
500 CAPSULE ORAL EVERY 8 HOURS SCHEDULED
Qty: 21 CAPSULE | Refills: 0 | Status: SHIPPED | OUTPATIENT
Start: 2024-03-25 | End: 2024-04-01

## 2024-03-25 NOTE — PATIENT INSTRUCTIONS
You have been prescribed amoxicillin for dental pain. This will help with sinus infection if it is bacterial.  Your symptoms appear to be viral.   You are to get an OTC cough, cold congestion medication for symptoms.    Follow up with your PCP in 3-5 days  Go to the ED if symptoms worsen   You may test yourself for covid 3 days after symptoms started.   
Airway patent, Nasal mucosa clear. Mouth with normal mucosa. Throat has no vesicles, no oropharyngeal exudates and uvula is midline.

## 2024-03-25 NOTE — LETTER
March 25, 2024     Patient: Renetta Richey   YOB: 1990   Date of Visit: 3/25/2024       To Whom It May Concern:    It is my medical opinion that Renetta Richey may return to work on 3/26/2024 .    If you have any questions or concerns, please don't hesitate to call.         Sincerely,        SABRINA Rodriguez    CC: No Recipients

## 2024-03-25 NOTE — PROGRESS NOTES
[facility]        NAME: Renetta Richey is a 33 y.o. female  : 1990    MRN: [id number]  DATE: 2024  TIME: 9:11 AM    Assessment and Plan   Acute URI [J06.9]  1. Acute URI  amoxicillin (AMOXIL) 500 mg capsule      2. Pain, dental  amoxicillin (AMOXIL) 500 mg capsule            Patient Instructions       Follow up with PCP in 3-5 days.  Proceed to  ER if symptoms worsen.    If tests have been performed at [facility], our office will contact you with results if changes need to be made to the care plan discussed with you at the visit.  [facility]    You have been prescribed amoxicillin for dental pain. This will help with sinus infection if it is bacterial.  Your symptoms appear to be viral.   You are to get an OTC cough, cold congestion medication for symptoms.    Follow up with your PCP in 3-5 days  Go to the ED if symptoms worsen   You may test yourself for covid 3 days after symptoms started.     Chief Complaint     Chief Complaint   Patient presents with    Dental Pain     X one week     Headache     Started yesterday     Generalized Body Aches     Started yesterday         History of Present Illness       This is a 33 year old female who states had a dental filling in September and was doing well with it and then 2 weeks ago started with some pain. She states she has a dental appointment this week. She states last night developed facial pressure, nasal congestion, headache.  Denies fevers, cough, n/v/d.  + chills.  Denies pregnancy.  States covid tested today but has negative.  She states she works at [employer].  PMH is listed. She denies taking anything for her symptoms.     Dental Pain   Associated symptoms include sinus pressure. Pertinent negatives include no fever.   Headache  Generalized Body Aches  Associated symptoms include congestion and headaches. Pertinent negatives include no fever or coughing.       Review of Systems   Review of Systems    Constitutional:  Positive for chills. Negative for fever.   HENT:  Positive for congestion, dental problem, sinus pressure and sinus pain.    Eyes: Negative.    Respiratory:  Negative for cough.    Cardiovascular: Negative.    Gastrointestinal: Negative.    Endocrine: Negative.    Genitourinary: Negative.    Musculoskeletal: Negative.    Skin: Negative.    Allergic/Immunologic: Negative.    Neurological:  Positive for headaches.   Hematological: Negative.    Psychiatric/Behavioral: Negative.           Current Medications       Current Outpatient Medications:     amoxicillin (AMOXIL) 500 mg capsule, Take 1 capsule (500 mg total) by mouth every 8 (eight) hours for 7 days, Disp: 21 capsule, Rfl: 0    Prenatal Multivit-Min-Fe-FA (Pre- Formula) TABS, Take by mouth, Disp: , Rfl:     Current Allergies     Allergies as of 2024    (No Known Allergies)            The following portions of the patient's history were reviewed and updated as appropriate: allergies, current medications, past family history, past medical history, past social history, past surgical history and problem list.     History reviewed. No pertinent past medical history.    Past Surgical History:   Procedure Laterality Date    TONSILLECTOMY      WISDOM TOOTH EXTRACTION         History reviewed. No pertinent family history.      Medications have been verified.        Objective   /74   Pulse (!) 106   Temp 98 °F (36.7 °C)   Resp 20   Wt 75.3 kg (166 lb)   SpO2 99%   BMI 30.36 kg/m²   No LMP recorded.       Physical Exam     Physical Exam  Vitals and nursing note reviewed.   Constitutional:       General: She is not in acute distress.     Appearance: Normal appearance. She is normal weight. She is not ill-appearing, toxic-appearing or diaphoretic.   HENT:      Head: Normocephalic and atraumatic.      Right Ear: Tympanic membrane normal.      Left Ear: Tympanic membrane and ear canal normal.      Nose: No congestion or rhinorrhea.       Mouth/Throat:      Mouth: Mucous membranes are moist.      Pharynx: No oropharyngeal exudate or posterior oropharyngeal erythema.     Eyes:      Extraocular Movements: Extraocular movements intact.   Cardiovascular:      Rate and Rhythm: Normal rate and regular rhythm.      Pulses: Normal pulses.      Heart sounds: Normal heart sounds. No murmur heard.  Pulmonary:      Effort: Pulmonary effort is normal. No respiratory distress.      Breath sounds: Normal breath sounds. No stridor. No wheezing, rhonchi or rales.   Chest:      Chest wall: No tenderness.   Musculoskeletal:         General: Normal range of motion.      Cervical back: Normal range of motion and neck supple.   Skin:     General: Skin is warm and dry.      Capillary Refill: Capillary refill takes less than 2 seconds.   Neurological:      General: No focal deficit present.      Mental Status: She is alert and oriented to person, place, and time.   Psychiatric:         Mood and Affect: Mood normal.         Behavior: Behavior normal.         Thought Content: Thought content normal.         Judgment: Judgment normal.

## 2025-03-12 ENCOUNTER — APPOINTMENT (EMERGENCY)
Dept: CT IMAGING | Facility: HOSPITAL | Age: 35
End: 2025-03-12
Payer: COMMERCIAL

## 2025-03-12 ENCOUNTER — APPOINTMENT (EMERGENCY)
Dept: RADIOLOGY | Facility: HOSPITAL | Age: 35
End: 2025-03-12
Payer: COMMERCIAL

## 2025-03-12 ENCOUNTER — HOSPITAL ENCOUNTER (EMERGENCY)
Facility: HOSPITAL | Age: 35
Discharge: HOME | End: 2025-03-13
Attending: EMERGENCY MEDICINE
Payer: COMMERCIAL

## 2025-03-12 DIAGNOSIS — R55 SYNCOPE: Primary | ICD-10-CM

## 2025-03-12 LAB
ALBUMIN SERPL BCG-MCNC: 4.5 G/DL (ref 3.5–5)
ALP SERPL-CCNC: 63 U/L (ref 34–104)
ALT SERPL W P-5'-P-CCNC: 31 U/L (ref 7–52)
ANION GAP SERPL CALCULATED.3IONS-SCNC: 7 MMOL/L (ref 4–13)
AST SERPL W P-5'-P-CCNC: 21 U/L (ref 13–39)
BASOPHILS # BLD AUTO: 0.03 THOUSANDS/ÂΜL (ref 0–0.1)
BASOPHILS NFR BLD AUTO: 1 % (ref 0–1)
BILIRUB SERPL-MCNC: 0.38 MG/DL (ref 0.2–1)
BUN SERPL-MCNC: 15 MG/DL (ref 5–25)
CALCIUM SERPL-MCNC: 9.4 MG/DL (ref 8.4–10.2)
CARDIAC TROPONIN I PNL SERPL HS: <2 NG/L (ref ?–50)
CHLORIDE SERPL-SCNC: 105 MMOL/L (ref 96–108)
CO2 SERPL-SCNC: 27 MMOL/L (ref 21–32)
CREAT SERPL-MCNC: 0.57 MG/DL (ref 0.6–1.3)
EOSINOPHIL # BLD AUTO: 1.12 THOUSAND/ÂΜL (ref 0–0.61)
EOSINOPHIL NFR BLD AUTO: 18 % (ref 0–6)
ERYTHROCYTE [DISTWIDTH] IN BLOOD BY AUTOMATED COUNT: 12.6 % (ref 11.6–15.1)
FLUAV RNA RESP QL NAA+PROBE: NEGATIVE
FLUBV RNA RESP QL NAA+PROBE: NEGATIVE
GFR SERPL CREATININE-BSD FRML MDRD: 123 ML/MIN/1.73SQ M
GLUCOSE SERPL-MCNC: 90 MG/DL (ref 65–140)
HCT VFR BLD AUTO: 34.5 % (ref 34.8–46.1)
HGB BLD-MCNC: 11.6 G/DL (ref 11.5–15.4)
IMM GRANULOCYTES # BLD AUTO: 0.01 THOUSAND/UL (ref 0–0.2)
IMM GRANULOCYTES NFR BLD AUTO: 0 % (ref 0–2)
LYMPHOCYTES # BLD AUTO: 0.7 THOUSANDS/ÂΜL (ref 0.6–4.47)
LYMPHOCYTES NFR BLD AUTO: 11 % (ref 14–44)
MCH RBC QN AUTO: 31.9 PG (ref 26.8–34.3)
MCHC RBC AUTO-ENTMCNC: 33.6 G/DL (ref 31.4–37.4)
MCV RBC AUTO: 95 FL (ref 82–98)
MONOCYTES # BLD AUTO: 0.58 THOUSAND/ÂΜL (ref 0.17–1.22)
MONOCYTES NFR BLD AUTO: 9 % (ref 4–12)
NEUTROPHILS # BLD AUTO: 3.92 THOUSANDS/ÂΜL (ref 1.85–7.62)
NEUTS SEG NFR BLD AUTO: 61 % (ref 43–75)
NRBC BLD AUTO-RTO: 0 /100 WBCS
PLATELET # BLD AUTO: 249 THOUSANDS/UL (ref 149–390)
PMV BLD AUTO: 9 FL (ref 8.9–12.7)
POTASSIUM SERPL-SCNC: 3.7 MMOL/L (ref 3.5–5.3)
PROT SERPL-MCNC: 7.2 G/DL (ref 6.4–8.4)
RBC # BLD AUTO: 3.64 MILLION/UL (ref 3.81–5.12)
RSV RNA RESP QL NAA+PROBE: NEGATIVE
SARS-COV-2 RNA RESP QL NAA+PROBE: NEGATIVE
SODIUM SERPL-SCNC: 139 MMOL/L (ref 135–147)
TSH SERPL DL<=0.05 MIU/L-ACNC: 0.89 UIU/ML (ref 0.45–4.5)
WBC # BLD AUTO: 6.36 THOUSAND/UL (ref 4.31–10.16)

## 2025-03-12 PROCEDURE — 84443 ASSAY THYROID STIM HORMONE: CPT | Performed by: EMERGENCY MEDICINE

## 2025-03-12 PROCEDURE — 96361 HYDRATE IV INFUSION ADD-ON: CPT

## 2025-03-12 PROCEDURE — 99285 EMERGENCY DEPT VISIT HI MDM: CPT

## 2025-03-12 PROCEDURE — 84484 ASSAY OF TROPONIN QUANT: CPT | Performed by: EMERGENCY MEDICINE

## 2025-03-12 PROCEDURE — 71275 CT ANGIOGRAPHY CHEST: CPT

## 2025-03-12 PROCEDURE — 80053 COMPREHEN METABOLIC PANEL: CPT | Performed by: EMERGENCY MEDICINE

## 2025-03-12 PROCEDURE — 85025 COMPLETE CBC W/AUTO DIFF WBC: CPT | Performed by: EMERGENCY MEDICINE

## 2025-03-12 PROCEDURE — 96360 HYDRATION IV INFUSION INIT: CPT

## 2025-03-12 PROCEDURE — 0241U HB NFCT DS VIR RESP RNA 4 TRGT: CPT | Performed by: EMERGENCY MEDICINE

## 2025-03-12 PROCEDURE — 99285 EMERGENCY DEPT VISIT HI MDM: CPT | Performed by: EMERGENCY MEDICINE

## 2025-03-12 PROCEDURE — 74177 CT ABD & PELVIS W/CONTRAST: CPT

## 2025-03-12 PROCEDURE — 93005 ELECTROCARDIOGRAM TRACING: CPT

## 2025-03-12 PROCEDURE — 36415 COLL VENOUS BLD VENIPUNCTURE: CPT | Performed by: EMERGENCY MEDICINE

## 2025-03-12 RX ORDER — NORGESTIMATE AND ETHINYL ESTRADIOL 7DAYSX3 28
1 KIT ORAL DAILY
COMMUNITY

## 2025-03-12 RX ORDER — ACETAMINOPHEN 325 MG/1
650 TABLET ORAL ONCE
Status: COMPLETED | OUTPATIENT
Start: 2025-03-12 | End: 2025-03-12

## 2025-03-12 RX ORDER — SODIUM CHLORIDE 9 MG/ML
3 INJECTION INTRAVENOUS
Status: DISCONTINUED | OUTPATIENT
Start: 2025-03-12 | End: 2025-03-13 | Stop reason: HOSPADM

## 2025-03-12 RX ORDER — ANASTROZOLE 1 MG/1
1 TABLET ORAL DAILY
COMMUNITY
Start: 2025-01-17 | End: 2026-01-17

## 2025-03-12 RX ORDER — SENNOSIDES A AND B 8.6 MG/1
17.2 TABLET, FILM COATED ORAL 2 TIMES DAILY PRN
COMMUNITY
Start: 2025-03-03

## 2025-03-12 RX ORDER — TRAMADOL HYDROCHLORIDE 50 MG/1
50 TABLET ORAL
COMMUNITY
Start: 2025-03-03

## 2025-03-12 RX ORDER — CALCIUM CARBONATE/VITAMIN D3 500 MG-10
1 TABLET,CHEWABLE ORAL DAILY
COMMUNITY
Start: 2025-01-17

## 2025-03-12 RX ADMIN — SODIUM CHLORIDE 1000 ML: 0.9 INJECTION, SOLUTION INTRAVENOUS at 21:12

## 2025-03-12 RX ADMIN — IOHEXOL 100 ML: 350 INJECTION, SOLUTION INTRAVENOUS at 21:47

## 2025-03-12 RX ADMIN — SODIUM CHLORIDE 1000 ML: 0.9 INJECTION, SOLUTION INTRAVENOUS at 23:45

## 2025-03-12 RX ADMIN — ACETAMINOPHEN 650 MG: 325 TABLET ORAL at 23:35

## 2025-03-13 VITALS
HEART RATE: 81 BPM | OXYGEN SATURATION: 100 % | RESPIRATION RATE: 13 BRPM | SYSTOLIC BLOOD PRESSURE: 113 MMHG | DIASTOLIC BLOOD PRESSURE: 63 MMHG | TEMPERATURE: 98.2 F

## 2025-03-13 LAB
ATRIAL RATE: 77 BPM
ATRIAL RATE: 78 BPM
CARDIAC TROPONIN I PNL SERPL HS: <2 NG/L (ref ?–50)
P AXIS: 64 DEGREES
P AXIS: 64 DEGREES
PR INTERVAL: 140 MS
PR INTERVAL: 142 MS
QRS AXIS: 81 DEGREES
QRS AXIS: 94 DEGREES
QRSD INTERVAL: 78 MS
QRSD INTERVAL: 80 MS
QT INTERVAL: 358 MS
QT INTERVAL: 366 MS
QTC INTERVAL: 408 MS
QTC INTERVAL: 414 MS
T WAVE AXIS: 54 DEGREES
T WAVE AXIS: 64 DEGREES
VENTRICULAR RATE: 77 BPM
VENTRICULAR RATE: 78 BPM

## 2025-03-13 PROCEDURE — 93010 ELECTROCARDIOGRAM REPORT: CPT | Performed by: INTERNAL MEDICINE

## 2025-03-13 NOTE — ED PROVIDER NOTES
Time reflects when diagnosis was documented in both MDM as applicable and the Disposition within this note       Time User Action Codes Description Comment    3/13/2025 12:16 AM Trino Cespedes Add [R55] Syncope           ED Disposition       ED Disposition   Discharge    Condition   Stable    Date/Time   u Mar 13, 2025 12:16 AM    Comment   Renetta Richey discharge to home/self care.                   Assessment & Plan       Medical Decision Making  EKG shows no ST elevations or significant ST depressions concerning for acute coronary syndrome or Brugada syndrome.  No evidence of AV block tachy jayla syndrome. No significantly shortened SC interval or delta wave to suggest Lazaro-Parkinson-White syndrome.  No significant LVH to suggest hypertrophic cardiomyopathy.  QTC within normal limits and no epsilon wave to suggest arrhythmogenic right ventricular dysplasia.      The history is suggestive of syncope.   The patient lacks the following red flags:    Old age  FH of sudden cardiac death  Evidence of bleeding  Exertional syncope  Palpitations prior to syncope  Loud Murmur  Persistently abnormal vitals  No Abnormal ECG   CHF  Suspicion of structural heart disease   Ischemic, dysrhythmic, obstructive, valvular  HCT <30   SOB  Hypotension (SBP <90)   Associated chest pain  Doubt CO poisoning  Because of patient's history of cancer and pleuritic pain on the right we determined her to be middle risk for PE and ultimately elected to go straight to CTA.  CT demonstrated no acute findings.  Discussed the possible sclerotic lesions in L1 and L2 and that she should discuss this with her oncologist.      Discussed warning signs and symptoms with the patient as well as when to return to the emergency department versus follow up with [facility]. Patient states understanding and agreement with the plan.  Patient care delayed due to critical capacity in the emergency department.      This note was completed using dictation software.        Amount and/or Complexity of Data Reviewed  Independent Historian: spouse  External Data Reviewed: notes.  Labs: ordered.  Radiology: ordered.  ECG/medicine tests: ordered and independent interpretation performed.    Risk  OTC drugs.  Prescription drug management.             Medications   sodium chloride 0.9 % bolus 1,000 mL (0 mL Intravenous Stopped 3/12/25 2330)   iohexol (OMNIPAQUE) 350 MG/ML injection (MULTI-DOSE) 100 mL (100 mL Intravenous Given 3/12/25 2147)   acetaminophen (TYLENOL) tablet 650 mg (650 mg Oral Given 3/12/25 2335)   sodium chloride 0.9 % bolus 1,000 mL (1,000 mL Intravenous New Bag 3/12/25 2345)       ED Risk Strat Scores                            SBIRT 22yo+      Flowsheet Row Most Recent Value   Initial Alcohol Screen: US AUDIT-C     1. How often do you have a drink containing alcohol? 0 Filed at: 03/12/2025 2050   2. How many drinks containing alcohol do you have on a typical day you are drinking?  0 Filed at: 03/12/2025 2050   3a. Male UNDER 65: How often do you have five or more drinks on one occasion? 0 Filed at: 03/12/2025 2050   3b. FEMALE Any Age, or MALE 65+: How often do you have 4 or more drinks on one occassion? 0 Filed at: 03/12/2025 2050   Audit-C Score 0 Filed at: 03/12/2025 2050   FAUSTO: How many times in the past year have you...    Used an illegal drug or used a prescription medication for non-medical reasons? Never Filed at: 03/12/2025 2050                            History of Present Illness       Chief Complaint   Patient presents with    Syncope     Patient presenting to ED for syncopal episode at home that occurred at approx 1920. Patient states she was bending over to pick something up and then remembers waking up on floor. Spouse at bedside states he heard a thud and found patient downstairs on floor. Denies blood thinners. Reports head strike.        Past Medical History:   Diagnosis Date    Breast cancer (HCC)       Past Surgical History:   Procedure  Laterality Date    HYSTERECTOMY N/A     TONSILLECTOMY      WISDOM TOOTH EXTRACTION        No family history on file.   Social History     Tobacco Use    Smoking status: Never    Smokeless tobacco: Never   Vaping Use    Vaping status: Never Used   Substance Use Topics    Alcohol use: Not Currently    Drug use: Never      E-Cigarette/Vaping    E-Cigarette Use Never User       E-Cigarette/Vaping Substances      I have reviewed and agree with the history as documented.     Short prodrome.  Patient states that she underwent surgery recently for full hysterectomy had some discomfort since then.  Notes that just prior to the syncope she had sharp pain on her right flank/right ribs.  Patient notes that she is in remission for breast cancer.  No dyspnea, no palpitations.  Reports she generally been recovering from the surgery well although some persistent feelings of bloating's and remained.  Patient is also had some decreased p.o. since the surgery.        Review of Systems   Constitutional:  Negative for activity change, chills, fatigue and fever.   HENT:  Negative for congestion.    Eyes:  Negative for visual disturbance.   Respiratory:  Negative for cough, chest tightness and shortness of breath.    Cardiovascular:  Negative for chest pain, palpitations and leg swelling.   Gastrointestinal:  Positive for abdominal pain. Negative for diarrhea and vomiting.   Genitourinary:  Negative for dysuria.   Skin:  Negative for rash.   Neurological:  Negative for dizziness, tremors, seizures, facial asymmetry, speech difficulty, weakness, numbness and headaches.           Objective       ED Triage Vitals [03/12/25 2044]   Temperature Pulse Blood Pressure Respirations SpO2 Patient Position - Orthostatic VS   98.2 °F (36.8 °C) 77 120/68 16 100 % Sitting      Temp Source Heart Rate Source BP Location FiO2 (%) Pain Score    Temporal Monitor Left arm -- 7      Vitals      Date and Time Temp Pulse SpO2 Resp BP Pain Score FACES Pain Rating  User   03/13/25 0000 98.2 °F (36.8 °C) 81 100 % 13 113/63 No Pain -- EZ   03/12/25 2335 -- -- -- -- -- 4 -- EZ   03/12/25 2300 -- 74 97 % 17 115/65 -- -- EM   03/12/25 2230 -- 75 100 % 16 109/63 -- -- EM   03/12/25 2200 -- 74 96 % 12 110/63 -- -- EM   03/12/25 2130 -- 79 100 % 18 127/72 -- -- EM   03/12/25 2121 -- -- -- -- -- 5 -- EM   03/12/25 2100 -- 82 98 % 13 111/66 -- -- EM   03/12/25 2045 -- 74 100 % 15 120/68 -- -- EM   03/12/25 2044 98.2 °F (36.8 °C) 77 100 % 16 120/68 7 -- SG            Physical Exam  Constitutional:       Appearance: She is well-developed.   HENT:      Head: Normocephalic and atraumatic.   Eyes:      Conjunctiva/sclera: Conjunctivae normal.      Pupils: Pupils are equal, round, and reactive to light.   Cardiovascular:      Rate and Rhythm: Normal rate and regular rhythm.      Heart sounds: Normal heart sounds.   Pulmonary:      Effort: Pulmonary effort is normal. No respiratory distress.      Breath sounds: Normal breath sounds.   Abdominal:      General: Bowel sounds are normal.      Palpations: Abdomen is soft.   Musculoskeletal:         General: Normal range of motion.      Cervical back: Normal range of motion and neck supple.   Skin:     General: Skin is warm and dry.      Capillary Refill: Capillary refill takes less than 2 seconds.   Neurological:      Mental Status: She is alert and oriented to person, place, and time.   Psychiatric:         Behavior: Behavior normal.         Results Reviewed       Procedure Component Value Units Date/Time    HS Troponin I 2hr [[id number]] Collected: 03/12/25 2355    Lab Status: Final result Specimen: Blood from Hand, Left Updated: 03/13/25 0021     hs TnI 2hr <2 ng/L      Delta 2hr hsTnI --    TSH, 3rd generation [[id number]]  (Normal) Collected: 03/12/25 2109    Lab Status: Final result Specimen: Blood from Arm, Left Updated: 03/12/25 2220     TSH 3RD GENERATON 0.885 uIU/mL     FLU/RSV/COVID - if FLU/RSV clinically relevant (2hr TAT) [[id number]]   (Normal) Collected: 03/12/25 2109    Lab Status: Final result Specimen: Nares from Nose Updated: 03/12/25 2205     SARS-CoV-2 Negative     INFLUENZA A PCR Negative     INFLUENZA B PCR Negative     RSV PCR Negative    Narrative:      This test has been performed using the CoV-2/Flu/RSV plus assay on the [employer] GeneXpert platform. This test has been validated by the  and verified by the performing laboratory.     This test is designed to amplify and detect the following: nucleocapsid (N), envelope (E), and RNA-dependent RNA polymerase (RdRP) genes of the SARS-CoV-2 genome; matrix (M), basic polymerase (PB2), and acidic protein (PA) segments of the influenza A genome; matrix (M) and non-structural protein (NS) segments of the influenza B genome, and the nucleocapsid genes of RSV A and RSV B.     Positive results are indicative of the presence of Flu A, Flu B, RSV, and/or SARS-CoV-2 RNA. Positive results for SARS-CoV-2 or suspected novel influenza should be reported to state, local, or federal health departments according to local reporting requirements.      All results should be assessed in conjunction with clinical presentation and other laboratory markers for clinical management.     FOR PEDIATRIC PATIENTS - copy/paste COVID Guidelines URL to browser: [email or web address]       HS Troponin 0hr (reflex protocol) [[id number]]  (Normal) Collected: 03/12/25 2109    Lab Status: Final result Specimen: Blood from Arm, Left Updated: 03/12/25 2143     hs TnI 0hr <2 ng/L     Comprehensive metabolic panel [[id number]]  (Abnormal) Collected: 03/12/25 2109    Lab Status: Final result Specimen: Blood from Arm, Left Updated: 03/12/25 2136     Sodium 139 mmol/L      Potassium 3.7 mmol/L      Chloride 105 mmol/L      CO2 27 mmol/L      ANION GAP 7 mmol/L      BUN 15 mg/dL      Creatinine 0.57 mg/dL      Glucose 90 mg/dL      Calcium 9.4 mg/dL      AST 21 U/L      ALT  31 U/L      Alkaline Phosphatase 63 U/L      Total Protein 7.2 g/dL      Albumin 4.5 g/dL      Total Bilirubin 0.38 mg/dL      eGFR 123 ml/min/1.73sq m     Narrative:      [employer] Kid[employer] guidelines for Chronic Kidney Disease (CKD):     Stage 1 with normal or high GFR (GFR > 90 mL/min/1.73 square meters)    Stage 2 Mild CKD (GFR = 60-89 mL/min/1.73 square meters)    Stage 3A Moderate CKD (GFR = 45-59 mL/min/1.73 square meters)    Stage 3B Moderate CKD (GFR = 30-44 mL/min/1.73 square meters)    Stage 4 Severe CKD (GFR = 15-29 mL/min/1.73 square meters)    Stage 5 End Stage CKD (GFR <15 mL/min/1.73 square meters)  Note: GFR calculation is accurate only with a steady state creatinine    CBC and differential [[id number]]  (Abnormal) Collected: 03/12/25 2109    Lab Status: Final result Specimen: Blood from Arm, Left Updated: 03/12/25 2122     WBC 6.36 Thousand/uL      RBC 3.64 Million/uL      Hemoglobin 11.6 g/dL      Hematocrit 34.5 %      MCV 95 fL      MCH 31.9 pg      MCHC 33.6 g/dL      RDW 12.6 %      MPV 9.0 fL      Platelets 249 Thousands/uL      nRBC 0 /100 WBCs      Segmented % 61 %      Immature Grans % 0 %      Lymphocytes % 11 %      Monocytes % 9 %      Eosinophils Relative 18 %      Basophils Relative 1 %      Absolute Neutrophils 3.92 Thousands/µL      Absolute Immature Grans 0.01 Thousand/uL      Absolute Lymphocytes 0.70 Thousands/µL      Absolute Monocytes 0.58 Thousand/µL      Eosinophils Absolute 1.12 Thousand/µL      Basophils Absolute 0.03 Thousands/µL             CT pe study w abdomen pelvis w contrast   Final Interpretation by Adeline White MD (03/12 7269)      No evidence of pulmonary embolus.      Mild right and minimal left hydroureteronephrosis likely on the basis of distended urinary bladder.      Sclerotic changes in bilateral iliac bones, in keeping with history of treated osseous metastatic disease.      Indeterminate sclerotic foci in L1 and L2 vertebral bodies.  Metastasis cannot be excluded.                     Workstation performed: [id number]             Procedures    ED Medication and Procedure Management   Prior to Admission Medications   Prescriptions Last Dose Informant Patient Reported? Taking?   Calcium Carb-Cholecalciferol 500-10 MG-MCG CHEW   Yes Yes   Sig: Chew 1 tablet daily   Prenatal Multivit-Min-Fe-FA (Pre- Formula) TABS   Yes No   Sig: Take by mouth   anastrozole (ARIMIDEX) 1 mg tablet   Yes Yes   Sig: Take 1 mg by mouth daily   norgestimate-ethinyl estradiol (Ortho Tri-Cyclen, 28,) 0.18/0.215/0.25 MG-35 MCG per tablet   Yes No   Sig: Take 1 tablet by mouth daily   senna (Senokot) 8.6 MG tablet   Yes Yes   Sig: Take 17.2 mg by mouth 2 (two) times a day as needed   traMADol (ULTRAM) 50 mg tablet   Yes Yes   Sig: Take 50 mg by mouth      Facility-Administered Medications: None     Discharge Medication List as of 3/13/2025 12:17 AM        CONTINUE these medications which have NOT CHANGED    Details   anastrozole (ARIMIDEX) 1 mg tablet Take 1 mg by mouth daily, Starting 2025, Until Sat 2026, Historical Med      Calcium Carb-Cholecalciferol 500-10 MG-MCG CHEW Chew 1 tablet daily, Starting 2025, Historical Med      senna (Senokot) 8.6 MG tablet Take 17.2 mg by mouth 2 (two) times a day as needed, Starting Mon 3/3/2025, Historical Med      traMADol (ULTRAM) 50 mg tablet Take 50 mg by mouth, Starting Mon 3/3/2025, Historical Med      norgestimate-ethinyl estradiol (Ortho Tri-Cyclen, 28,) 0.18/0.215/0.25 MG-35 MCG per tablet Take 1 tablet by mouth daily, Historical Med      Prenatal Multivit-Min-Fe-FA (Pre- Formula) TABS Take by mouth, Historical Med           No discharge procedures on file.  ED SEPSIS DOCUMENTATION   Time reflects when diagnosis was documented in both MDM as applicable and the Disposition within this note       Time User Action Codes Description Comment    3/13/2025 12:16 AM Trino Cespedes Add [R55] Syncope                   Trino Cespedes MD  03/15/25 0017       Trino Cespedes MD  03/15/25 0017